# Patient Record
Sex: FEMALE | Race: WHITE | NOT HISPANIC OR LATINO | Employment: FULL TIME | URBAN - METROPOLITAN AREA
[De-identification: names, ages, dates, MRNs, and addresses within clinical notes are randomized per-mention and may not be internally consistent; named-entity substitution may affect disease eponyms.]

---

## 2021-03-26 DIAGNOSIS — Z23 ENCOUNTER FOR IMMUNIZATION: ICD-10-CM

## 2022-06-03 ENCOUNTER — OFFICE VISIT (OUTPATIENT)
Dept: OBGYN CLINIC | Facility: MEDICAL CENTER | Age: 62
End: 2022-06-03
Payer: COMMERCIAL

## 2022-06-03 VITALS
DIASTOLIC BLOOD PRESSURE: 82 MMHG | SYSTOLIC BLOOD PRESSURE: 130 MMHG | HEIGHT: 66 IN | BODY MASS INDEX: 20.48 KG/M2 | WEIGHT: 127.4 LBS

## 2022-06-03 DIAGNOSIS — N81.4 UTERINE PROLAPSE: ICD-10-CM

## 2022-06-03 DIAGNOSIS — N95.0 POSTMENOPAUSAL BLEEDING: Primary | ICD-10-CM

## 2022-06-03 DIAGNOSIS — B37.3 YEAST VAGINITIS: ICD-10-CM

## 2022-06-03 PROCEDURE — 99203 OFFICE O/P NEW LOW 30 MIN: CPT | Performed by: STUDENT IN AN ORGANIZED HEALTH CARE EDUCATION/TRAINING PROGRAM

## 2022-06-03 RX ORDER — OMEGA-3/DHA/EPA/FISH OIL 300-1000MG
CAPSULE ORAL
COMMUNITY

## 2022-06-03 RX ORDER — FLUCONAZOLE 150 MG/1
TABLET ORAL
Qty: 2 TABLET | Refills: 0 | Status: SHIPPED | OUTPATIENT
Start: 2022-06-03 | End: 2022-06-10

## 2022-06-03 NOTE — PROGRESS NOTES
Assessment/Plan:      Diagnoses and all orders for this visit:    Postmenopausal bleeding  Biopsy if thickened endometrial stripe  No bleeding on exam    -     US pelvis complete w transvaginal; Future    Uterine prolapse  Discussed options - PT, pessary, surgery  Will start with PT  Given pamphlet with contact info  Yeast vaginitis  Diflucan sent  F/u for annual exam     Other orders  -     fish oil-omega-3 fatty acids 1000 MG capsule; Take by mouth          Subjective:     Patient ID: Bj Schulz is a 57 y o  female  58 yo [id number] presents with a few episodes of bright red spotting which started 3 wks ago  She now has mucousy clear discharge for which she's had to wear a tampon  She does have some itching  No odor  Marcia Cheney went through menopause at 50  Her last pap was 10 years ago  She is sexually active w/ her  of 40 years  Marcia Cheney has known uterine prolapse  She is symptomatic  She feels a bulge and sometimes has urinary frequency and difficulty emptying her bladder  Review of Systems   All other systems reviewed and are negative  Objective:     Physical Exam  Vitals reviewed  Pulmonary:      Effort: Pulmonary effort is normal    Genitourinary:     Labia:         Right: No rash  Left: No rash  Vagina: Vaginal discharge present  Cervix: No cervical motion tenderness, discharge, friability or lesion  Uterus: With uterine prolapse  Adnexa:         Right: No mass, tenderness or fullness  Left: No mass, tenderness or fullness  Comments: Clear discharge, +hyphae on microscopy  Neurological:      Mental Status: She is alert and oriented to person, place, and time     Psychiatric:         Behavior: Behavior normal

## 2022-06-08 ENCOUNTER — TELEPHONE (OUTPATIENT)
Dept: OBGYN CLINIC | Facility: CLINIC | Age: 62
End: 2022-06-08

## 2022-06-08 DIAGNOSIS — Z87.42 HISTORY OF VAGINAL DISCHARGE: Primary | ICD-10-CM

## 2022-06-08 NOTE — TELEPHONE ENCOUNTER
Pt of CS but she is out of office  Spoke with pt, she informed me she took first tablet Friday, sx gone Saturday but then returned early Sunday and took second tablet Sunday night  Sx then continued through Monday  Having the discharge and itching still  Does pt need another OVS or other recc?

## 2022-06-08 NOTE — TELEPHONE ENCOUNTER
Pt got prescribed diflucan for yeast infection  She took the first tablet Friday night, symptoms were still there on Saturday Took the second tablet Sunday, symptoms went away, then Monday, they came back   Would like to know if there are other options    Please advise

## 2022-06-08 NOTE — TELEPHONE ENCOUNTER
If she is willing to try a cream we can try mycolog  The combination of both antifungal and steroid may help her     If this does not work then she needs office visit    Daily x 7 days

## 2022-06-14 ENCOUNTER — HOSPITAL ENCOUNTER (OUTPATIENT)
Dept: ULTRASOUND IMAGING | Facility: CLINIC | Age: 62
Discharge: HOME | End: 2022-06-14
Attending: STUDENT IN AN ORGANIZED HEALTH CARE EDUCATION/TRAINING PROGRAM
Payer: COMMERCIAL

## 2022-06-14 DIAGNOSIS — N95.0 POSTMENOPAUSAL BLEEDING: ICD-10-CM

## 2022-06-14 PROCEDURE — 76856 US EXAM PELVIC COMPLETE: CPT

## 2022-06-14 PROCEDURE — 76830 TRANSVAGINAL US NON-OB: CPT

## 2022-06-15 ENCOUNTER — NURSE TRIAGE (OUTPATIENT)
Dept: OTHER | Facility: OTHER | Age: 62
End: 2022-06-15

## 2022-06-15 DIAGNOSIS — N39.0 URINARY TRACT INFECTION WITHOUT HEMATURIA, SITE UNSPECIFIED: Primary | ICD-10-CM

## 2022-06-15 NOTE — TELEPHONE ENCOUNTER
Regarding: possible UTI   ----- Message from Brenda Taveras sent at 6/15/2022  5:38 PM EDT -----  "I am having  lower back pain/ pressure and pressure in my abdomen  There was a lot more blood than normal  We thought it was a yeast infection  I think I am having a UTI instead   I am having the urge to go frequently and overall exhausted "

## 2022-06-15 NOTE — TELEPHONE ENCOUNTER
Patient called in to report low back pain with urinary frequency; felt yeast infection was resolved and no further itching  On call provider contacted and advised to order Urinalysis and urine culture  Patient will go to lab 6/16/22 AM  Patient advised to follow up with office  Reason for Disposition   [1] Symptoms of a "yeast infection" (i e , itchy, white discharge, not bad smelling) AND [2] not improved > 3 days following CARE ADVICE   Urinating more frequently than usual (i e , frequency)    Answer Assessment - Initial Assessment Questions  1  DISCHARGE: "Describe the discharge " (e g , white, yellow, green, gray, foamy, cottage cheese-like)      Clear with brown tint with bloody spurt    2  ODOR: "Is there a bad odor?"      Denies    3  ONSET: "When did the discharge begin?"      March 2022    4  RASH: "Is there a rash in that area?" If Yes, ask: "Describe it " (e g , redness, blisters, sores, bumps)      Denies    5  ABDOMINAL PAIN: "Are you having any abdominal pain?" If Yes, ask: "What does it feel like? " (e g , crampy, dull, intermittent, constant)       Lower back pain mid; crampy    6  ABDOMINAL PAIN SEVERITY: If present, ask: "How bad is it?"  (e g , mild, moderate, severe)   - MILD - doesn't interfere with normal activities    - MODERATE - interferes with normal activities or awakens from sleep    - SEVERE - patient doesn't want to move (R/O peritonitis)      Mild    7  CAUSE: "What do you think is causing the discharge?" "Have you had the same problem before? What happened then?"      Yeast infection treated, but not fully resolved    8  OTHER SYMPTOMS: "Do you have any other symptoms?" (e g , fever, itching, vaginal bleeding, pain with urination, injury to genital area, vaginal foreign body)      Urine frequency with low back pain and pressure    9   PREGNANCY: "Is there any chance you are pregnant?" "When was your last menstrual period?"      Denies    Answer Assessment - Initial Assessment Questions  1  SYMPTOM: "What's the main symptom you're concerned about?" (e g , frequency, incontinence)      Frequency    2  ONSET: "When did the smptom start?"     6/13/22    3  PAIN: "Is there any pain?" If Yes, ask: "How bad is it?" (Scale: 1-10; mild, moderate, severe)      Mild    4  CAUSE: "What do you think is causing the symptoms?"      Possible UTI    5  OTHER SYMPTOMS: "Do you have any other symptoms?" (e g , fever, flank pain, blood in urine, pain with urination)      Low back pain    6   PREGNANCY: "Is there any chance you are pregnant?" "When was your last menstrual period?"      Denies    Protocols used: VAGINAL DISCHARGE-ADULT-AH, [facility]

## 2022-06-16 ENCOUNTER — APPOINTMENT (OUTPATIENT)
Dept: LAB | Facility: CLINIC | Age: 62
End: 2022-06-16
Payer: COMMERCIAL

## 2022-06-16 ENCOUNTER — TELEPHONE (OUTPATIENT)
Dept: OBGYN CLINIC | Facility: CLINIC | Age: 62
End: 2022-06-16

## 2022-06-16 ENCOUNTER — EVALUATION (OUTPATIENT)
Dept: PHYSICAL THERAPY | Age: 62
End: 2022-06-16
Payer: COMMERCIAL

## 2022-06-16 DIAGNOSIS — N81.4 UTERINE PROLAPSE: Primary | ICD-10-CM

## 2022-06-16 LAB
BACTERIA UR QL AUTO: NORMAL /HPF
BILIRUB UR QL STRIP: NEGATIVE
CLARITY UR: CLEAR
COLOR UR: NORMAL
GLUCOSE UR STRIP-MCNC: NEGATIVE MG/DL
HGB UR QL STRIP.AUTO: NEGATIVE
KETONES UR STRIP-MCNC: NEGATIVE MG/DL
LEUKOCYTE ESTERASE UR QL STRIP: NEGATIVE
NITRITE UR QL STRIP: NEGATIVE
NON-SQ EPI CELLS URNS QL MICRO: NORMAL /HPF
PH UR STRIP.AUTO: 6 [PH]
PROT UR STRIP-MCNC: NEGATIVE MG/DL
RBC #/AREA URNS AUTO: NORMAL /HPF
SP GR UR STRIP.AUTO: 1.01 (ref 1–1.03)
UROBILINOGEN UR STRIP-ACNC: <2 MG/DL
WBC #/AREA URNS AUTO: NORMAL /HPF

## 2022-06-16 PROCEDURE — 97162 PT EVAL MOD COMPLEX 30 MIN: CPT | Performed by: PHYSICAL THERAPIST

## 2022-06-16 PROCEDURE — 97110 THERAPEUTIC EXERCISES: CPT | Performed by: PHYSICAL THERAPIST

## 2022-06-16 PROCEDURE — 81001 URINALYSIS AUTO W/SCOPE: CPT

## 2022-06-16 PROCEDURE — 87086 URINE CULTURE/COLONY COUNT: CPT

## 2022-06-16 NOTE — PROGRESS NOTES
PT Evaluation     Today's date: 2022  Patient name: Bridget Hays  : 1960  MRN: [id number]  Referring provider: Dora Rosado*  Dx:   Encounter Diagnosis     ICD-10-CM    1  Uterine prolapse  N81 4        Start Time: 1015  Stop Time: 1115  Total time in clinic (min): 60 minutes    Assessment  Assessment details: Jordon Zendejas is a 57year old female who presents with known pelvic organ prolapse  Direct PFM examination deferred this date due to possible active infection  Time spent on patient education regarding body mechanics for toileting, PFM relaxation, bladder health and double voiding  Issued HEP with understanding verbalized  Patient will benefit from a trial of PFPT to achieve goals as outlined  Understanding of Dx/Px/POC: good   Prognosis: good    Goals  STG 2 weeks  1  PFM examination  2  Reduce bladder irritants  3  Improve bladder emptying through relaxation and double voiding    LTG  3-12 weeks  1  HEP, ongoing throughout POC  2  3/5 or greater PFM with full relaxation post activation  3  Demonstrate proper breathing and activation for lifting and ADLs to minimize stress to PFM  4 Return to exercise  Plan  Patient would benefit from: skilled physical therapy  Planned therapy interventions: manual therapy, motor coordination training, neuromuscular re-education, patient education, therapeutic activities, therapeutic exercise, behavior modification and home exercise program  Frequency: 1x week  Duration in weeks: 10  Plan of Care beginning date: 2022  Plan of Care expiration date: 2022  Treatment plan discussed with: patient        PT Pelvic Floor Subjective:   History of Present Illness:   Was diagnosed with uterine prolapse 12 years ago  One year ago PT for hip issues post vigorous exercise on her own  Noted hip improvement  Was doing deep squats with holding and feels that POP worsened  Notes since April noting vaginal discharge   Was treated for yeast infection 13 days ago  Discharge persists  Saturating pantyliner every two hours with clear fluid and blood Transvaginal US yesterday  Has been feeling LBP and abdominal cramping  Is to have urinalysis today  Would like to return to exercise but is concerned of worsening prolapse  Social Support:     Lives with:  Spouse    Work status: employed full time    Life stress severity: moderate and severe  Diet and Exercise:      Exercise type: walking    Exercise frequency: daily    Walks dog  Floor exercises  OB/ gyn History    Gestational History:     Number of term pregnancies: 3  Bladder Function:     Voiding Difficulties positive for: urgency, hesitancy and incomplete emptying      Voiding Difficulties comments:     Voiding frequency: every 3-4 hours    Urinary leakage: no urine leakage    Painful urination: No      Fluid Intake Type:  Coffee, water, tea and alcohol    Intake (ounces): Intake (ounces) comment: 2 cups of coffee in the moring  Tea  Wine    Patient education today regarding voiding mechanisms and bladder health  Discussed double voiding and reducing bladder irritants  Bowel Function:     Bowel frequency: daily    Uses "squatty potty": no Squatty Potty (discussed use today)  Sexual Function:     Sexually Active:  Sexually active    Dyspareunia: occasional pain that eases with position changes      Pain:     Current pain ratin    Location:  Left hip lateral with lateral discomfort to ankle; LBP and lower abdominal cramping  Patient Goals:     Patient goals for therapy:  Improved bladder or bowel function, fully empty bladder or bowels and return to sport/leisure activities    Other patient goals:  Physically active without worrying about aggravating; empty bladder fully      Objective     Neurological Testing     Sensation     Lumbar   Left   Intact: light touch    Right   Intact: light touch    General Comments:    Lower quarter screen   Knees: unremarkable  Foot/ankle: unremarkable    Hip Comments   History of bilateral hip pain; left worse  L LE pain at times  Limited bilateral hip ER, otherwise WFLS  Adductor and piriformis tightness  4-/5 hip musculature  Pelvic Floor Exam   Abdominal assessment: Deferred direct PFM examination due to vaginal discharge and currently undergoing treatment and diagnostic testing    [address] instruction this date with emphasis on sensing PFM relaxation               Precautions:      Manuals 6/16                                                                Neuro Re-Ed                                                                                                        Ther Ex             CRK 3"/10"/10x  HEP                                                                                                       Ther Activity                                       Gait Training                                       Modalities

## 2022-06-17 ENCOUNTER — TELEPHONE (OUTPATIENT)
Dept: OBGYN CLINIC | Facility: CLINIC | Age: 62
End: 2022-06-17

## 2022-06-17 LAB — BACTERIA UR CULT: NORMAL

## 2022-06-17 NOTE — TELEPHONE ENCOUNTER
IN REGARDS to previous note, pt would like a call to discuss,   both this & her Pelvis US results please,  Thanks

## 2022-06-17 NOTE — TELEPHONE ENCOUNTER
Pt called, states she continues to have vaginal discharge, clear fluid, with brown around edges of panty liner " denies vag odor  States itching resolved with diflucan taken on 6/3 & 6/5  Pt has a hx of vag  fungal infection in the past  Per Dr Barbee Bence, recommendation, pt can use mycolog daily x 7 days  Order Rx'd to pharmacy  Informed pt if no relief of symptoms, then she will need an OV  Advised pt to call pharmacy prior to picking up medication  Routed to DR MANNING for her signature

## 2022-06-20 ENCOUNTER — TELEPHONE (OUTPATIENT)
Dept: OBGYN CLINIC | Facility: CLINIC | Age: 62
End: 2022-06-20

## 2022-06-20 NOTE — TELEPHONE ENCOUNTER
Called and spoke to pt and informed below  She is aware to call us if continued bleeding  Said the nystatin has been helping  Also mentioned she starts therapy this week as well

## 2022-06-22 ENCOUNTER — OFFICE VISIT (OUTPATIENT)
Dept: PHYSICAL THERAPY | Age: 62
End: 2022-06-22
Payer: COMMERCIAL

## 2022-06-22 DIAGNOSIS — N81.4 UTERINE PROLAPSE: Primary | ICD-10-CM

## 2022-06-22 PROCEDURE — 97110 THERAPEUTIC EXERCISES: CPT | Performed by: PHYSICAL THERAPIST

## 2022-06-22 NOTE — PROGRESS NOTES
Daily Note     Today's date: 2022  Patient name: Jackson Carr  : 1960  MRN: [id number]  Referring provider: Nacho Goodwin*  Dx:   Encounter Diagnosis     ICD-10-CM    1  Uterine prolapse  N81 4        Start Time:   Stop Time: 711  Total time in clinic (min): 57 minutes    Subjective: US was negative and does not need to have biopsy  Negative for UTI  Now on day 4 of yeast infection treatment  Notes suprapubic cramping today and now at time of treatment  Notes some mucous and green vaginal discharge for which she will contact her doctor  Notes prolapse more present at the end of the day  Denies back pain today  Notes left hip soreness without L LE radicular  Using stooll for BM with greater ease  Objective: See treatment diary below      Assessment: Tolerated treatment well  Patient would benefit from continued PT Deferred direct PFM assessment due to presenting symptoms and continued treatment  HEP instruction this date  Verbalized and demonstrated understanding  Written handouts provided  Plan: Continue per plan of care        Precautions:      Manuals                                                                Neuro Re-Ed                                                                                                        Ther Ex             CRK 3"/10"/10x  HEP 5"/10"/10x           Ball with Kegel  3"  /10x  HEP           TB with Kegel  BTB  10x  HEP           Segmental bridge  10x  HEP           clam  10x  HEP                                     LE stretch  10'  PP           Ther Activity                                       Gait Training                                       Modalities

## 2022-06-29 NOTE — PROGRESS NOTES
Daily Note     Today's date: 2022  Patient name: Jorge Crum  : 1960  MRN: [id number]  Referring provider: Rowan Crew*  Dx:   Encounter Diagnosis     ICD-10-CM    1  Uterine prolapse  N81 4        Start Time: 310  Stop Time: 401  Total time in clinic (min): 51 minutes    Subjective: Notes spotting has decreased  Has finished treatment for yeast infection  Notes clear vaginal fluid that seems copious at times  Relates she will call GYN  Objective: See treatment diary below      Assessment: Tolerated treatment well  Patient would benefit from continued PT Direct PFM examination today  Lacks voluntary contraction with cough with good voluntary relaxation  Strength 3/5 left; 2+/5 right with limited lift  Endurance at 4 seconds for 4 repetitions before 50% decrease in power  Anterior/ central POP noted at rest visible at introitus that protrudes past hymenal remnant with bearing down  Reviewed exercises with proper breathing technique and ways to avoid strain on PF musculature  Patient verbalzied understanding  Patient is quite active and wishes to remain so  Suggested she may benefit from a pessary due to activity level and degree of prolapse  Plan: Continue per plan of care        Precautions:      Manuals                                                               Neuro Re-Ed                                                                                           PFM assessment   15          Ther Ex             CRK 3"/10"/10x  HEP 5"/10"/10x 10'          Ball with Kegel  3"  /10x  HEP           TB with Kegel  BTB  10x  HEP           Segmental bridge  10x  HEP review          clam  10x  HEP                                     LE stretch  10'  PP           Ther Activity             Pt education for breath and lift   10                       Gait Training                                       Modalities

## 2022-06-30 ENCOUNTER — OFFICE VISIT (OUTPATIENT)
Dept: PHYSICAL THERAPY | Age: 62
End: 2022-06-30
Payer: COMMERCIAL

## 2022-06-30 DIAGNOSIS — N81.4 UTERINE PROLAPSE: Primary | ICD-10-CM

## 2022-06-30 PROCEDURE — 97110 THERAPEUTIC EXERCISES: CPT | Performed by: PHYSICAL THERAPIST

## 2022-07-06 ENCOUNTER — TELEPHONE (OUTPATIENT)
Dept: OBGYN CLINIC | Facility: CLINIC | Age: 62
End: 2022-07-06

## 2022-07-06 NOTE — TELEPHONE ENCOUNTER
Pt has a grade 3 prolapse  Was seen for it and she is debating a pessary  She had some  bleeding and had an u/s - 3 mm lining  She has also been rxed with 2 diflucan and nystatin cream  Pt very active and lifts grandkids and exercises a lot  She had moderate amt bleeding Sat and Sun  Off and on spotting  I am not sure - but could prolapse be pulling and bleeding  She is in pt for this presently  Pt still has small isabel yellow/green dsch  No odor  I have no idea what to do/??   Please advise and thank you

## 2022-07-06 NOTE — TELEPHONE ENCOUNTER
Pt has a bladder prolapse; she was treated for a problem by Dr Ryan Luna; was treated for yeast and then was ordered a cream, she recently has some blood when wiping and has a greenish mucos  She is going to pelvic floor physical therapy and was diagnosed with level 3  She is very active - was lifing grandchildren  Pt also had pelvic & abdominal ultrasound done a few wks ago  Pt of Dr Ryan Luna

## 2022-07-15 ENCOUNTER — OFFICE VISIT (OUTPATIENT)
Dept: OBGYN CLINIC | Facility: CLINIC | Age: 62
End: 2022-07-15

## 2022-07-15 VITALS
BODY MASS INDEX: 20.79 KG/M2 | HEIGHT: 65 IN | SYSTOLIC BLOOD PRESSURE: 132 MMHG | DIASTOLIC BLOOD PRESSURE: 88 MMHG | WEIGHT: 124.8 LBS

## 2022-07-15 DIAGNOSIS — N81.2 UTEROVAGINAL PROLAPSE, INCOMPLETE: ICD-10-CM

## 2022-07-15 DIAGNOSIS — N95.0 POSTMENOPAUSAL BLEEDING: Primary | ICD-10-CM

## 2022-07-15 PROCEDURE — 88305 TISSUE EXAM BY PATHOLOGIST: CPT | Performed by: PATHOLOGY

## 2022-07-15 PROCEDURE — G0145 SCR C/V CYTO,THINLAYER,RESCR: HCPCS | Performed by: OBSTETRICS & GYNECOLOGY

## 2022-07-15 RX ORDER — DIPHENOXYLATE HYDROCHLORIDE AND ATROPINE SULFATE 2.5; .025 MG/1; MG/1
1 TABLET ORAL DAILY
COMMUNITY

## 2022-07-18 LAB
HPV HR 12 DNA CVX QL NAA+PROBE: NEGATIVE
HPV16 DNA CVX QL NAA+PROBE: NEGATIVE
HPV18 DNA CVX QL NAA+PROBE: NEGATIVE

## 2022-07-22 LAB
LAB AP GYN PRIMARY INTERPRETATION: NORMAL
Lab: NORMAL

## 2022-09-14 ENCOUNTER — ANNUAL EXAM (OUTPATIENT)
Dept: OBGYN CLINIC | Facility: CLINIC | Age: 62
End: 2022-09-14
Payer: COMMERCIAL

## 2022-09-14 VITALS
BODY MASS INDEX: 20.62 KG/M2 | SYSTOLIC BLOOD PRESSURE: 124 MMHG | WEIGHT: 123.8 LBS | HEIGHT: 65 IN | DIASTOLIC BLOOD PRESSURE: 70 MMHG

## 2022-09-14 DIAGNOSIS — N95.0 POST-MENOPAUSAL BLEEDING: ICD-10-CM

## 2022-09-14 DIAGNOSIS — Z01.419 ENCOUNTER FOR ANNUAL ROUTINE GYNECOLOGICAL EXAMINATION: Primary | ICD-10-CM

## 2022-09-14 DIAGNOSIS — Z12.31 ENCOUNTER FOR SCREENING MAMMOGRAM FOR MALIGNANT NEOPLASM OF BREAST: ICD-10-CM

## 2022-09-14 DIAGNOSIS — N81.10 PELVIC ORGAN PROLAPSE QUANTIFICATION STAGE 2 CYSTOCELE: ICD-10-CM

## 2022-09-14 DIAGNOSIS — Z12.11 ENCOUNTER FOR SCREENING COLONOSCOPY: ICD-10-CM

## 2022-09-14 PROCEDURE — A4562 PESSARY, NON RUBBER,ANY TYPE: HCPCS | Performed by: STUDENT IN AN ORGANIZED HEALTH CARE EDUCATION/TRAINING PROGRAM

## 2022-09-14 PROCEDURE — S0612 ANNUAL GYNECOLOGICAL EXAMINA: HCPCS | Performed by: STUDENT IN AN ORGANIZED HEALTH CARE EDUCATION/TRAINING PROGRAM

## 2022-09-14 RX ORDER — FLUTICASONE PROPIONATE 50 MCG
2 SPRAY, SUSPENSION (ML) NASAL DAILY
COMMUNITY

## 2022-09-14 RX ORDER — BUPROPION HYDROCHLORIDE 150 MG/1
150 TABLET ORAL EVERY MORNING
COMMUNITY
Start: 2022-08-04

## 2022-09-26 PROCEDURE — 57160 INSERT PESSARY/OTHER DEVICE: CPT | Performed by: STUDENT IN AN ORGANIZED HEALTH CARE EDUCATION/TRAINING PROGRAM

## 2022-09-26 NOTE — PROGRESS NOTES
Assessment/Plan:      Diagnoses and all orders for this visit:    Encounter for annual routine gynecological examination  Pap due 2027  Colon cancer screening up to date  Encounter for screening mammogram for malignant neoplasm of breast  -     Mammo screening bilateral w 3d & cad; Future    Encounter for screening colonoscopy    Pelvic organ prolapse quantification stage 2 cystocele  Fit for pessary today - see note    Post-menopausal bleeding  Recurrent w/ insufficient biopsy  Recommend D&C  Will f/u for pre-op  Other orders  -     buPROPion (WELLBUTRIN XL) 150 mg 24 hr tablet; Take 150 mg by mouth every morning  -     fluticasone (FLONASE) 50 mcg/act nasal spray; 2 sprays into each nostril daily          Subjective:     Patient ID: Rashi Moreira is a 57 y o  female  This is a 57 y o  postmenopausal [id number] who presents for annual exam      Concerns: pelvic organ prolapse, completed pelvic floor PT  Interested in pessary  Recurrent post menopausal bleeding over the past several months  Sometimes wears tampon for prolapse and notices blood, but also has had bleeding when not wearing tampon  Reported bleeding at her last visit w/ me in June - ultrasound showed 3 mm stripe  Had endometrial biopsy 7/15 which was insufficient    She denies vaginal bleeding, discharge, or pelvic pain  Sexually active: yes, w/o concerns  STD testing: declines    Screening:  Last pap smear: 7/15/22 neg/neg  Last mammogram: Mammo 8/23/22 BI-RADS 1   Colon Cancer screening: cologuard 2022 negative    Family history:  Breast cancer: no  Colon cancer: no   Ovarian cancer: no        Review of Systems   Constitutional: Negative  HENT: Negative  Eyes: Negative  Respiratory: Negative  Cardiovascular: Negative  Gastrointestinal: Negative  Endocrine: Negative  Genitourinary: Positive for vaginal bleeding   Negative for dyspareunia, dysuria, frequency, menstrual problem, pelvic pain, vaginal discharge and vaginal pain    Musculoskeletal: Negative  Skin: Negative  Allergic/Immunologic: Negative  Neurological: Negative  Hematological: Negative  Psychiatric/Behavioral: Negative  Objective:     Physical Exam  Vitals reviewed  Cardiovascular:      Rate and Rhythm: Normal rate  Pulmonary:      Effort: Pulmonary effort is normal    Chest:   Breasts: Breasts are symmetrical       Right: No mass, nipple discharge, skin change or tenderness  Left: No mass, nipple discharge, skin change or tenderness  Abdominal:      Palpations: Abdomen is soft  Genitourinary:     Vagina: Normal  No signs of injury  Uterus: Not enlarged and not fixed  Adnexa:         Right: No mass  Left: No mass  Musculoskeletal:      Cervical back: Normal range of motion  Skin:     General: Skin is warm and dry  Neurological:      Mental Status: She is alert and oriented to person, place, and time

## 2022-09-26 NOTE — PROGRESS NOTES
Pessary    Date/Time: 9/26/2022 2:41 PM  Performed by: Tiffany Lima MD  Authorized by: Tiffany Lima MD   Universal Protocol:  Consent: Verbal consent obtained  Risks and benefits: risks, benefits and alternatives were discussed  Consent given by: patient  Patient understanding: patient states understanding of the procedure being performed  Patient identity confirmed: verbally with patient      Indication:     Indication for pessary: uterine prolapse and cystocele    Pre-procedure:     Pessary procedure type:  Fitting  Procedure:     Pessary type:  Ring w/ support    Pessary size:  4    Patient tolerance of procedure:  Aborted and tolerated well, no immediate complications  Comments:     Procedure comments: Will trial pessary  Aware of reasons to call - pain, bleeding, difficulty with bowel or bladder function

## 2022-09-28 ENCOUNTER — OFFICE VISIT (OUTPATIENT)
Dept: OBGYN CLINIC | Facility: CLINIC | Age: 62
End: 2022-09-28
Payer: COMMERCIAL

## 2022-09-28 VITALS
DIASTOLIC BLOOD PRESSURE: 78 MMHG | WEIGHT: 125 LBS | SYSTOLIC BLOOD PRESSURE: 120 MMHG | HEIGHT: 65 IN | BODY MASS INDEX: 20.83 KG/M2

## 2022-09-28 DIAGNOSIS — N81.11 MIDLINE CYSTOCELE: Primary | ICD-10-CM

## 2022-09-28 PROCEDURE — 99213 OFFICE O/P EST LOW 20 MIN: CPT | Performed by: STUDENT IN AN ORGANIZED HEALTH CARE EDUCATION/TRAINING PROGRAM

## 2022-09-28 NOTE — PROGRESS NOTES
Assessment/Plan:      Diagnoses and all orders for this visit:    Midline cystocele  Will try #3 ring pessary  Has pre-op with KTM for recurrent PMB, insufficient EMB  No further bleeding since last visit  Subjective:     Patient ID: Maria Antonia Boswell is a 57 y o  female  56 yo F w/ pelvic organ prolapse presents for f/u from pessary fitting  She went home with a #4 ring pessary but had discomfort  She took it out and tried to replace it but still had pain  She is interested in trying a smaller size  Review of Systems   All other systems reviewed and are negative  Objective:     Physical Exam  Vitals reviewed  Genitourinary:     Comments: #3 ring pessary placed w/o issue  Neurological:      Mental Status: She is alert and oriented to person, place, and time     Psychiatric:         Behavior: Behavior normal

## 2022-10-30 NOTE — PROGRESS NOTES
Assessment/Plan:    Postmenopausal bleeding  -     Tissue Exam  -     Liquid-based pap, screening  -     HPV High Risk    Uterovaginal prolapse, incomplete    Other orders  -     multivitamin (THERAGRAN) TABS; Take 1 tablet by mouth daily  -     CALCIUM PO; Take by mouth        Subjective:      Patient ID: Theophilus Hashimoto is a 57 y o  female  [facility] presents for problem visit  She is 56yo and postmenopausal       Symptoms have been present since early May  She has not had any bleeding since July 3rd - and bleeding seems to only be present with heavy lifting  She denies pain and cramping  Discomfort related to prolapse  Pelvic US on 6/14/22:  3mm endometrial stripe            The following portions of the patient's history were reviewed and updated as appropriate: allergies, current medications and problem list     Review of Systems      Objective:      /88 (BP Location: Left arm, Patient Position: Sitting, Cuff Size: Standard)   Ht 5' 5" (1 651 m)   Wt 56 6 kg (124 lb 12 8 oz)   BMI 20 77 kg/m²          Physical Exam  Vitals reviewed  Constitutional:       Appearance: Normal appearance  She is normal weight  Genitourinary:     Comments: Uterovaginal prolapse present  Pap/HPV and EMB collected  Neurological:      Mental Status: She is alert

## 2022-10-31 PROBLEM — M19.042 PRIMARY OSTEOARTHRITIS OF BOTH HANDS: Status: ACTIVE | Noted: 2021-08-03

## 2022-10-31 PROBLEM — E78.2 MIXED HYPERLIPIDEMIA: Status: ACTIVE | Noted: 2018-09-25

## 2022-10-31 PROBLEM — I78.1 NON-NEOPLASTIC NEVUS: Status: ACTIVE | Noted: 2017-06-20

## 2022-10-31 PROBLEM — I78.1 TELANGIECTASIA DISORDER: Status: ACTIVE | Noted: 2017-06-20

## 2022-10-31 PROBLEM — M19.041 PRIMARY OSTEOARTHRITIS OF BOTH HANDS: Status: ACTIVE | Noted: 2021-08-03

## 2022-10-31 PROBLEM — F32.9 REACTIVE DEPRESSION: Status: ACTIVE | Noted: 2022-08-04

## 2022-10-31 PROBLEM — F41.9 ANXIETY: Status: ACTIVE | Noted: 2022-08-04

## 2022-10-31 PROBLEM — N95.0 POSTMENOPAUSAL BLEEDING: Status: ACTIVE | Noted: 2022-10-31

## 2022-10-31 NOTE — ASSESSMENT & PLAN NOTE
Insufficient endometirlal biopsy - scant material  Ultrasound shows thin endometrial stripe 3mm  Options: D&C hysteroscopy vs observation  Bleeding has resolved s/p treatment of vaginitis and use of pessary  IF return of bleeding, plan for imaging and consider rebiopsy  Most likely cause at this time atrophy, vaginitis, prolapse  Low risk for hyperplasia, cancer

## 2022-11-01 ENCOUNTER — OFFICE VISIT (OUTPATIENT)
Dept: OBGYN CLINIC | Facility: CLINIC | Age: 62
End: 2022-11-01

## 2022-11-01 VITALS
HEIGHT: 65 IN | DIASTOLIC BLOOD PRESSURE: 66 MMHG | SYSTOLIC BLOOD PRESSURE: 114 MMHG | BODY MASS INDEX: 20.79 KG/M2 | WEIGHT: 124.8 LBS

## 2022-11-01 DIAGNOSIS — N95.0 POSTMENOPAUSAL BLEEDING: Primary | ICD-10-CM

## 2022-11-01 NOTE — PROGRESS NOTES
Assessment/Plan:    Postmenopausal bleeding  Insufficient endometirlal biopsy - scant material  Ultrasound shows thin endometrial stripe 3mm  Options: D&C hysteroscopy vs observation  Bleeding has resolved s/p treatment of vaginitis and use of pessary  IF return of bleeding, plan for imaging and consider rebiopsy  Most likely cause at this time atrophy, vaginitis, prolapse  Low risk for hyperplasia, cancer  Diagnoses and all orders for this visit:    Postmenopausal bleeding          Subjective:      Patient ID: Glen Traore is a 57 y o  female  Patient present to discuss D&C  Recurrent PMB  Last time there was bleeding? May    61 yo G5P 3 0 2 3 with follow up to vaginal bleeding  Has had imaging completed 3mm stripe and has had biopsy  In addition, dx'd with yeast infection and prolapse  She now is using a pessary and had tried pelvic floor physical therapy  No further bleeding has been noted    Reviewed possible plan of care - D&C, repeat biopsy, repeat imaging  Pessary  Use - when to change/cleanse  Consider estrogen cream if atrophy worsens or OTC lubricants/perineal massage  Is sexually active  Is able to remove pessary by herself and reinsert  Overall happy with pessary at this time  Gynecologic Exam  She complains of vaginal bleeding  She reports no genital itching, genital lesions, genital odor, genital rash, missed menses, pelvic pain or vaginal discharge  This is a new problem  The current episode started more than 1 month ago  The problem occurs rarely  The problem has been resolved since onset  The patient is experiencing no pain  Pertinent negatives include no abdominal pain, back pain, chills, constipation, diarrhea, dysuria, fever, frequency, hematuria, nausea, painful intercourse, rash, sore throat, urgency or vomiting  The vaginal discharge was scant and bloody  The vaginal bleeding is spotting  Patient has not been passing clots   Patient has not been passing tissue  The symptoms are aggravated by activity and heavy lifting  The treatment provided significant relief  She is sexually active  The following portions of the patient's history were reviewed and updated as appropriate:   She  has no past medical history on file  She   Patient Active Problem List    Diagnosis Date Noted   • Postmenopausal bleeding 10/31/2022   • Anxiety 08/04/2022   • Reactive depression 08/04/2022   • Primary osteoarthritis of both hands 08/03/2021   • Mixed hyperlipidemia 09/25/2018   • Non-neoplastic nevus 06/20/2017   • Telangiectasia disorder 06/20/2017   • Lyme disease 09/12/2008     She  has a past surgical history that includes Hartman tooth extraction (2012)  Her family history includes Cancer in her paternal aunt  She  reports that she has quit smoking  She has never used smokeless tobacco  She reports current alcohol use of about 5 0 standard drinks of alcohol per week  She reports that she does not use drugs  Current Outpatient Medications   Medication Sig Dispense Refill   • buPROPion (WELLBUTRIN XL) 150 mg 24 hr tablet Take 150 mg by mouth every morning     • CALCIUM PO Take by mouth     • fish oil-omega-3 fatty acids 1000 MG capsule Take by mouth     • fluticasone (FLONASE) 50 mcg/act nasal spray 2 sprays into each nostril daily     • multivitamin (THERAGRAN) TABS Take 1 tablet by mouth daily     • nystatin-triamcinolone (MYCOLOG-II) cream Use daily for 7 days  (Patient not taking: No sig reported) 30 g 0     No current facility-administered medications for this visit       Current Outpatient Medications on File Prior to Visit   Medication Sig   • buPROPion (WELLBUTRIN XL) 150 mg 24 hr tablet Take 150 mg by mouth every morning   • CALCIUM PO Take by mouth   • fish oil-omega-3 fatty acids 1000 MG capsule Take by mouth   • fluticasone (FLONASE) 50 mcg/act nasal spray 2 sprays into each nostril daily   • multivitamin (THERAGRAN) TABS Take 1 tablet by mouth daily   • nystatin-triamcinolone (MYCOLOG-II) cream Use daily for 7 days  (Patient not taking: No sig reported)     No current facility-administered medications on file prior to visit  She is allergic to codeine       Review of Systems   Constitutional: Negative for activity change, appetite change, chills, fatigue and fever  HENT: Negative for rhinorrhea, sneezing and sore throat  Eyes: Negative for visual disturbance  Respiratory: Negative for cough, shortness of breath and wheezing  Cardiovascular: Negative for chest pain, palpitations and leg swelling  Gastrointestinal: Negative for abdominal distention, abdominal pain, constipation, diarrhea, nausea and vomiting  Genitourinary: Negative for difficulty urinating, dysuria, frequency, hematuria, missed menses, pelvic pain, urgency and vaginal discharge  Musculoskeletal: Negative for back pain  Skin: Negative for rash  Neurological: Negative for syncope and light-headedness           Objective:      /66 (BP Location: Left arm, Patient Position: Sitting, Cuff Size: Standard)   Ht 5' 5" (1 651 m)   Wt 56 6 kg (124 lb 12 8 oz)   BMI 20 77 kg/m²          Physical Exam

## 2023-02-16 ENCOUNTER — OFFICE VISIT (OUTPATIENT)
Dept: DERMATOLOGY | Facility: CLINIC | Age: 63
End: 2023-02-16

## 2023-02-16 VITALS — BODY MASS INDEX: 20.66 KG/M2 | HEIGHT: 65 IN | WEIGHT: 124 LBS

## 2023-02-16 DIAGNOSIS — L81.4 LENTIGO: Primary | ICD-10-CM

## 2023-02-16 DIAGNOSIS — L82.1 SEBORRHEIC KERATOSIS: ICD-10-CM

## 2023-02-16 DIAGNOSIS — Z13.89 SCREENING FOR SKIN CONDITION: ICD-10-CM

## 2023-02-16 RX ORDER — CETIRIZINE HYDROCHLORIDE 10 MG/1
10 TABLET ORAL DAILY
COMMUNITY

## 2023-02-16 RX ORDER — CALCIUM POLYCARBOPHIL 625 MG 625 MG/1
625 TABLET ORAL DAILY
COMMUNITY

## 2023-02-16 NOTE — PROGRESS NOTES
[address]  [address] [facility] [address]  [phone]  [phone]     MRN: [id number] : 1960  Encounter: [id number]  Patient Information: Magy Daniel  Chief complaint: skin lesions     History of present illness: 68-year-old female presents for overall checkup patient has not been seen regarding several spots on her face  No past medical history on file  Past Surgical History:   Procedure Laterality Date   • WISDOM TOOTH EXTRACTION       Social History   Social History     Substance and Sexual Activity   Alcohol Use Yes   • Alcohol/week: 5 0 standard drinks   • Types: 5 Glasses of wine per week     Social History     Substance and Sexual Activity   Drug Use Never     Social History     Tobacco Use   Smoking Status Former   Smokeless Tobacco Never     Family History   Problem Relation Age of Onset   • Cancer Paternal Aunt    • Colon cancer Neg Hx    • Breast cancer Neg Hx    • Cervical cancer Neg Hx    • Uterine cancer Neg Hx      Meds/Allergies   Allergies   Allergen Reactions   • Codeine GI Intolerance       Meds:  Prior to Admission medications    Medication Sig Start Date End Date Taking? Authorizing Provider   buPROPion (WELLBUTRIN XL) 150 mg 24 hr tablet Take 150 mg by mouth every morning 22  Yes Historical Provider, MD   CALCIUM PO Take by mouth   Yes Historical Provider, MD   calcium polycarbophil (FIBERCON) 625 mg tablet Take 625 mg by mouth daily   Yes Historical Provider, MD   cetirizine (ZyrTEC) 10 mg tablet Take 10 mg by mouth daily   Yes Historical Provider, MD   fish oil-omega-3 fatty acids 1000 MG capsule Take by mouth   Yes Historical Provider, MD   fluticasone (FLONASE) 50 mcg/act nasal spray 2 sprays into each nostril daily   Yes Historical Provider, MD   nystatin-triamcinolone (MYCOLOG-II) cream Use daily for 7 days    Patient not taking: No sig reported 22  Vandana Suarez MD   multivitamin [facility]) TABS Take 1 tablet by mouth daily  Patient not taking: Reported on 2/16/2023    Historical Provider, MD       Subjective:     Review of Systems:    General: negative for - chills, fatigue, fever,  weight gain or weight loss  Psychological: negative for - anxiety, behavioral disorder, concentration difficulties, decreased libido, depression, irritability, memory difficulties, mood swings, sleep disturbances or suicidal ideation  ENT: negative for - hearing difficulties , nasal congestion, nasal discharge, oral lesions, sinus pain, sneezing, sore throat  Allergy and Immunology: negative for - hives, insect bite sensitivity,  Hematological and Lymphatic: negative for - bleeding problems, blood clots,bruising, swollen lymph nodes  Endocrine: negative for - hair pattern changes, hot flashes, malaise/lethargy, mood swings, palpitations, polydipsia/polyuria, skin changes, temperature intolerance or unexpected weight change  Respiratory: negative for - cough, hemoptysis, orthopnea, shortness of breath, or wheezing  Cardiovascular: negative for - chest pain, dyspnea on exertion, edema,  Gastrointestinal: negative for - abdominal pain, nausea/vomiting  Genito-Urinary: negative for - dysuria, incontinence, irregular/heavy menses or urinary frequency/urgency  Musculoskeletal: negative for - gait disturbance, joint pain, joint stiffness, joint swelling, muscle pain, muscular weakness  Dermatological:  As in HPI  Neurological: negative for confusion, dizziness, headaches, impaired coordination/balance, memory loss, numbness/tingling, seizures, speech problems, tremors or weakness       Objective:   Ht 5' 5" (1 651 m)   Wt 56 2 kg (124 lb)   BMI 20 63 kg/m²     Physical Exam:    General Appearance:    Alert, cooperative, no distress   Head:    Normocephalic, without obvious abnormality, atraumatic           Skin:   A full skin exam was performed including scalp, head scalp, eyes, ears, nose, lips, neck, chest, axilla, abdomen, back, buttocks, bilateral upper extremities, bilateral lower extremities, hands, feet, fingers, toes, fingernails, and toenails keratotic papules with greasy stuck on appearance and also atypical noted exam lentigines noted nothing else of concern     Assessment:     1  Seborrheic keratosis        2  Screening for skin condition        3  Lentigo              Plan:   Lentigines we will go ahead and use a bleaching agent from [employer]  Seborrheic Keratosis  Patient reasurred these are normal growths we acquire with age no treatment needed  Screening for Dermatologic Disorders: Nothing else of concern noted on complete exam follow up in 1 year       Danitza George MD  1/23/5364,5:97 PM    Portions of the record may have been created with voice recognition software   Occasional wrong word or "sound a like" substitutions may have occurred due to the inherent limitations of voice recognition software   Read the chart carefully and recognize, using context, where substitutions have occurred

## 2023-02-16 NOTE — PROGRESS NOTES
[facility] Note     Patient Name: Jose Rivers  Encounter Date: February 16, 2023     Have you been cared for by a [place] [address] Dermatologist in the last 3 years and, if so, which description applies to you? NO  I am considered a "new" patient and must complete all patient intake questions  I am FEMALE/of child-bearing potential     REVIEW OF SYSTEMS:  Have you recently had or currently have any of the following? · Recent fever or chills? No  · Any non-healing wound? No  · Are you pregnant or planning to become pregnant? No  · Are you currently or planning to be nursing or breast feeding? No   PAST MEDICAL HISTORY:  Have you personally ever had or currently have any of the following? If "YES," then please provide more detail  · Skin cancer (such as Melanoma, Basal Cell Carcinoma, Squamous Cell Carcinoma? No  · Tuberculosis, HIV/AIDS, Hepatitis B or C: No  · Systemic Immunosuppression such as Diabetes, Biologic or Immunotherapy, Chemotherapy, Organ Transplantation, Bone Marrow Transplantation No  · Radiation Treatment No   FAMILY HISTORY:  Any "first degree relatives" (parent, brother, sister, or child) with the following? • Skin Cancer, Pancreatic or Other Cancer? No   PATIENT EXPERIENCE:    • Do you want the Dermatologist to perform a COMPLETE skin exam today including a clinical examination under the "bra and underwear" areas? NO  • If necessary, do we have your permission to call and leave a detailed message on your Preferred Phone number that includes your specific medical information?   Yes      Allergies   Allergen Reactions   • Codeine GI Intolerance      Current Outpatient Medications:   •  buPROPion (WELLBUTRIN XL) 150 mg 24 hr tablet, Take 150 mg by mouth every morning, Disp: , Rfl:   •  CALCIUM PO, Take by mouth, Disp: , Rfl:   •  calcium polycarbophil (FIBERCON) 625 mg tablet, Take 625 mg by mouth daily, Disp: , Rfl:   •  cetirizine (ZyrTEC) 10 mg tablet, Take 10 mg by mouth daily, Disp: , Rfl:   •  fish oil-omega-3 fatty acids 1000 MG capsule, Take by mouth, Disp: , Rfl:   •  fluticasone (FLONASE) 50 mcg/act nasal spray, 2 sprays into each nostril daily, Disp: , Rfl:   •  nystatin-triamcinolone (MYCOLOG-II) cream, Use daily for 7 days   (Patient not taking: No sig reported), Disp: 30 g, Rfl: 0  •  multivitamin (THERAGRAN) TABS, Take 1 tablet by mouth daily (Patient not taking: Reported on 2/16/2023), Disp: , Rfl:           • Whom besides the patient is providing clinical information about today's encounter?   o NO ADDITIONAL HISTORIAN (patient alone provided history)    Physical Exam and Assessment/Plan by Diagnosis:

## 2023-03-09 ENCOUNTER — TELEPHONE (OUTPATIENT)
Dept: OBGYN CLINIC | Facility: CLINIC | Age: 63
End: 2023-03-09

## 2023-03-09 NOTE — TELEPHONE ENCOUNTER
Left voice message to patient that we can reach out to [address] [facility] to see if any sooner appointments  If she prefers a referral we can ask KTM also and get one entered for her- just let ultrasound know

## 2023-03-14 DIAGNOSIS — N81.4 UTERINE PROLAPSE: ICD-10-CM

## 2023-03-14 DIAGNOSIS — N81.11 MIDLINE CYSTOCELE: Primary | ICD-10-CM

## 2023-03-14 NOTE — TELEPHONE ENCOUNTER
Sent referral to Ms Vanessa Moody for Dr Alvaro Bob  In addition, Ms Vanessa Moody would like refills on the nystatin and triam

## 2023-03-14 NOTE — TELEPHONE ENCOUNTER
Spoke with Ms Nicole Sorto to discuss a sooner appointment with Dr Bruno Choudhury  Pt is asking if Dr Rose Mary Garcia can put in a referral for Dr Unique León  I will discuss with Dr Rose Mary Garcia and call Ms Nicole Sorto back to advise

## 2023-06-05 ENCOUNTER — OFFICE VISIT (OUTPATIENT)
Dept: DERMATOLOGY | Facility: CLINIC | Age: 63
End: 2023-06-05
Payer: COMMERCIAL

## 2023-06-05 DIAGNOSIS — R23.4 SKIN TEXTURE CHANGES: ICD-10-CM

## 2023-06-05 DIAGNOSIS — L81.4 LENTIGO: ICD-10-CM

## 2023-06-05 DIAGNOSIS — H02.59 LAXITY OF EYELID: Primary | ICD-10-CM

## 2023-06-05 DIAGNOSIS — L82.1 SEBORRHEIC KERATOSIS: ICD-10-CM

## 2023-06-05 PROCEDURE — COSCON COSMETIC CONSULTATION: Performed by: STUDENT IN AN ORGANIZED HEALTH CARE EDUCATION/TRAINING PROGRAM

## 2023-06-05 RX ORDER — NITROFURANTOIN 25; 75 MG/1; MG/1
CAPSULE ORAL
COMMUNITY
Start: 2023-05-24

## 2023-06-05 RX ORDER — AMOXICILLIN 875 MG/1
875 TABLET, COATED ORAL EVERY 12 HOURS
COMMUNITY
Start: 2023-06-02

## 2023-06-05 RX ORDER — CONJUGATED ESTROGENS 0.62 MG/G
CREAM VAGINAL
COMMUNITY
Start: 2023-05-12

## 2023-06-05 NOTE — PATIENT INSTRUCTIONS
COSMETIC CONSULT, SEBORRHEIC KERATOSES    Assessment and Plan:  Based on a thorough discussion of this condition and the management approach to it (including a comprehensive discussion of the known risks, side effects and potential benefits of treatment), the patient (family) agrees to implement the following specific plan:  Discussed cosmetic removal:  Discussed destruction with electrodessication with light curettage or cryotherapy  Discussed cosmetic charge: $250 charge for shave removal    To help smoothen those on body, recommended using over the counter creams containaining gentle acids  Including:   Skin Fix Renewing cream  Amlactin's line of lotions (lactic acid containing)   Cerave SA (salicylic acid containing) lotion or cream  KP Duty cream (see photo below)  Choose one of the above to start  EYELID LAXITY COSMETIC CONSULT    Assessment and Plan:  Based on a thorough discussion of this condition and the management approach to it (including a comprehensive discussion of the known risks, side effects and potential benefits of treatment), the patient (family) agrees to implement the following specific plan:  -Discussed blepharoplasty as best treatment to remove excess skin  Would recommend plastics or oculoplastics for consultation   -Referral placed for [facility]     COSMETIC CONSULT LEG VEINS    Assessment and Plan:  Based on a thorough discussion of this condition and the management approach to it (including a comprehensive discussion of the known risks, side effects and potential benefits of treatment), the patient (family) agrees to implement the following specific plan:  - Discussed Cosmetic treatment with injections:  - Asclera injections discussed - Price for each site $344   - Recommend Compression stockings 20-30 mmHg for 24-48 hours after treatment  SKIN CARE RECOMMENDATIONS     Morning    Wash your face with a gentle cleanser   Some "examples are listed below but get one that works for you  Avoid ones with harsh scrubbing beads:  Cerave hydrating facial cleanser  Cetaphil gentle cleanser   LaRoche-Posay hydrating gentle cleanser  Neutrogena ultra gentle hydrating cleanser    2  Apply Vitamin C  Look for \"Ascorbic acid\" as an ingredient  This is the most effective form of Vitamin C  Some examples are listed below but get one that works for you  Cerave Skin Renewing Vitamin C Serum:   LaRouge-Posay vitamin C serum   ISDIN flavo-C Ultraglican Ampules  PCA SKIN Vitamin C-Quench Antioxidant Hydrating Facial Serum (formerly known as PCA SKIN C Quench Antioxidant Serum)    3  Apply your peptide treatments  Use these at least once a day on the face, neck, and you can put the remaining on your hands and pat down to your chest   Some examples are listed below but get one that works for you  A  Neocutis Bio Serum Firm Rejuvenating Growth Factor & Peptide Treatment  B  SkinMedica TNS® Advanced+ Serum          4  Please continue to use your moisturizer with SPF 30  Some sunscreen we recommend are:   A  La-Roche Posay Antihelios 50 Mineral    B  Elta MD UV Clear Broad Spectrum SPF 55  C  ISDIN Eryfotona Ageless Broad Spectrum SPF 50  D  Neutrogena Purescreen+ Tinted Mineral Sunscreen    5  Follow with make-up as needed     Night    Wash your face with a gentle cleanser  Some examples are listed below but get one that works for you  Avoid ones with harsh scrubbing beads:  Cerave hydrating facial cleanser  Cetaphil gentle cleanser   LaRoche-Posay hydrating gentle cleanser  Neutrogena ultra gentle hydrating cleanser    2   +/- Second application of peptides  3  Apply an oil free moisturizer  Some examples are listed below but get one that works for you  A  Neutrogena Hydroboost for EXTRADRY skin   B  The Ordinary Natural Moisturizing Factors HA   C  First Aid Beauty Hydrafirm Night Cream  D  Alastin Ultra nourishing Moisturizer    4   Apply a pea-sized " amount of a vitamin A/lightening cream ( TRANEXAMIC ACID 5%/NIACINAMIDE 4%/KOJIC ACID 2%/TRETINOIN 0 025% CREAM)- an even thin layer on your face  Can be drying  You should be contacted within a few days by the pharmacy ([phone]) as discussed via phone, text, or email  They will take your payment information & your medication will be mailed to you  Start by using once weekly for one week (Monday), then increase to twice weekly (Mon, Fri) the next week and then three times weekly on week three (Mon, Wed, Friday)  You can use this on your hands as well  Avoid the eye area and the curves around your nose and corners of your lips  If you find you are still a bit dry doing the above, try sandwiching the cream between two layers of moisturizer  If too irritating, take a night or two off  For example, if you find you are doing well using 2x weekly and get dry and irritated with 3x weekly, go back to using 2x weekly for a week or two and then re-trail increasing how often you use it  5  Can add a second layer of moisturizer  This is helpful if you find you feel your skin is dry

## 2023-06-05 NOTE — PROGRESS NOTES
"[place] [address] [facility] Note     Patient Name: Donte Zimmerman  Encounter Date: 6/5/23     Have you been cared for by a [place] [address] Dermatologist in the last 3 years and, if so, which description applies to you? Yes  I have been here within the last 3 years, and my medical history has NOT changed since that time  I am FEMALE/of child-bearing potential     REVIEW OF SYSTEMS:  Have you recently had or currently have any of the following? · No changes in my recent health  PAST MEDICAL HISTORY:  Have you personally ever had or currently have any of the following? If \"YES,\" then please provide more detail  · No changes in my medical history  FAMILY HISTORY:  Any \"first degree relatives\" (parent, brother, sister, or child) with the following? • No changes in my family's known health  PATIENT EXPERIENCE:    • Do you want the Dermatologist to perform a COMPLETE skin exam today including a clinical examination under the \"bra and underwear\" areas? NO  • If necessary, do we have your permission to call and leave a detailed message on your Preferred Phone number that includes your specific medical information?   Yes      Allergies   Allergen Reactions   • Codeine GI Intolerance      Current Outpatient Medications:   •  buPROPion (WELLBUTRIN XL) 150 mg 24 hr tablet, Take 150 mg by mouth every morning, Disp: , Rfl:   •  CALCIUM PO, Take by mouth, Disp: , Rfl:   •  calcium polycarbophil (FIBERCON) 625 mg tablet, Take 625 mg by mouth daily, Disp: , Rfl:   •  cetirizine (ZyrTEC) 10 mg tablet, Take 10 mg by mouth daily, Disp: , Rfl:   •  Diclofenac Sodium (VOLTAREN) 1 %, apply 2 grams topically to affected area four times a day, Disp: , Rfl:   •  fish oil-omega-3 fatty acids 1000 MG capsule, Take by mouth, Disp: , Rfl:   •  fluticasone (FLONASE) 50 mcg/act nasal spray, 2 sprays into each nostril daily, Disp: , Rfl:   •  multivitamin (THERAGRAN) TABS, Take 1 tablet by mouth daily, Disp: , Rfl:   •  Premarin vaginal " cream, insert 0 5 grams vaginally at bedtime three times a week (MONDAY, WEDNESDAY, AND FRIDAY), Disp: , Rfl:   •  amoxicillin (AMOXIL) 875 mg tablet, Take 875 mg by mouth every 12 (twelve) hours (Patient not taking: Reported on 6/5/2023), Disp: , Rfl:   •  nitrofurantoin (MACROBID) 100 mg capsule, take 1 capsule by mouth daily with food START TAKING 6/19/23 TO 6/21/23 (Patient not taking: Reported on 6/5/2023), Disp: , Rfl:   •  nystatin-triamcinolone (MYCOLOG-II) cream, Use daily for 7 days  , Disp: 30 g, Rfl: 0          • Whom besides the patient is providing clinical information about today's encounter?   o NO ADDITIONAL HISTORIAN (patient alone provided history)    Physical Exam and Assessment/Plan by Diagnosis:    COSMETIC CONSULT, SEBORRHEIC KERATOSES  XEROSIS, BODY    Physical Exam:  • Anatomic Location Affected & Morphological Description:  stuck on tan to skin colored papule on the Right temple, xerotic skin on torso    Additional History of Present Condition:  Patient present for a lesion on the right temple  She states it has been present for a while and brown in color and slightly raised  Assessment and Plan:  Based on a thorough discussion of this condition and the management approach to it (including a comprehensive discussion of the known risks, side effects and potential benefits of treatment), the patient (family) agrees to implement the following specific plan:  • Discussed destruction with electrodessication with light curettage or cryotherapy  • Discussed cosmetic charge: $250 charge for shave removal    • To help smoothen those on body, recommended using over the counter creams containaining gentle acids  Including:   o Skin Fix Renewing cream  o Amlactin's line of lotions (lactic acid containing)   o Cerave SA (salicylic acid containing) lotion or cream  o KP Duty cream (see photo below)  • Choose one of the above to start                              EYELID LAXITY COSMETIC CONSULT    Physical Exam:  Anatomic Location Affected & Morphological Description:  Dermatochalasis with visible bulging of  fat pad on lower lid  Pertinent Positives:  Pertinent Negatives: Additional History of Present Condition:  Pt bothered by appearance, notes she feels uncomfortable wearing contacts because is more visible w/o glasses    Assessment and Plan:  Based on a thorough discussion of this condition and the management approach to it (including a comprehensive discussion of the known risks, side effects and potential benefits of treatment), the patient (family) agrees to implement the following specific plan:  -Discussed blepharoplasty as best treatment to remove excess skin  Would recommend plastics or oculoplastics for consultation   -Referral placed for [address] [place] [address] Plastic Surgery     COSMETIC CONSULT LEG VEINS    Physical Exam:  • Anatomic Location Affected & Morphological Description:  Bilateral lower legs  • Pertinent Positives:  • Pertinent Negatives: Additional History of Present Condition:  Patient states she has many superficial veins that have appeared over the years  Assessment and Plan:  Based on a thorough discussion of this condition and the management approach to it (including a comprehensive discussion of the known risks, side effects and potential benefits of treatment), the patient (family) agrees to implement the following specific plan:  - Discussed Cosmetic treatment with injections:  - Asclera injections discussed - Price for each site $344, discussed need for 4+ treatments   - Recommend Compression stockings 20-30 mmHg for 24-48 hours after treatment  SKIN CARE RECOMMENDATIONS     Morning    1  Wash your face with a gentle cleanser  Some examples are listed below but get one that works for you  Avoid ones with harsh scrubbing beads:  2  Cerave hydrating facial cleanser  3  Cetaphil gentle cleanser   4  LaRoche-Posay hydrating gentle cleanser  5   Neutrogena ultra gentle "hydrating cleanser    2  Apply Vitamin C  Look for \"Ascorbic acid\" as an ingredient  This is the most effective form of Vitamin C  Some examples are listed below but get one that works for you   a  Cerave Skin Renewing Vitamin C Serum:   b  LaRouge-Posay vitamin C serum   c  ISDIN flavo-C Ultraglican Ampules  d  PCA SKIN Vitamin C-Quench Antioxidant Hydrating Facial Serum (formerly known as PCA SKIN C Quench Antioxidant Serum)    3  Apply your peptide treatments  Use these at least once a day on the face, neck, and you can put the remaining on your hands and pat down to your chest   Some examples are listed below but get one that works for you  A  Neocutis Bio Serum Firm Rejuvenating Growth Factor & Peptide Treatment  B  SkinMedica TNS® Advanced+ Serum          4  Please continue to use your moisturizer with SPF 30  Some sunscreen we recommend are:   A  La-Roche Posay Antihelios 50 Mineral    B  Elta MD UV Clear Broad Spectrum SPF 55  C  ISDIN Eryfotona Ageless Broad Spectrum SPF 50  D  Neutrogena Purescreen+ Tinted Mineral Sunscreen    5  Follow with make-up as needed     Night    1  Wash your face with a gentle cleanser  Some examples are listed below but get one that works for you  Avoid ones with harsh scrubbing beads:  a  Cerave hydrating facial cleanser  b  Cetaphil gentle cleanser   c  LaRoche-Posay hydrating gentle cleanser  d  Neutrogena ultra gentle hydrating cleanser    2   +/- Second application of peptides  3  Apply an oil free moisturizer  Some examples are listed below but get one that works for you  A  Neutrogena Hydroboost for EXTRADRY skin   B  The Ordinary Natural Moisturizing Factors HA   C  First Aid Beauty Hydrafirm Night Cream  D  Alastin Ultra nourishing Moisturizer    4  Apply a pea-sized amount of a vitamin A/lightening cream ( TRANEXAMIC ACID 5%/NIACINAMIDE 4%/KOJIC ACID 2%/TRETINOIN 0 025% CREAM)- an even thin layer on your face  Can be drying   You should be contacted within a few " days by the pharmacy ([phone]) as discussed via phone, text, or email  They will take your payment information & your medication will be mailed to you  Start by using once weekly for one week (Monday), then increase to twice weekly (Mon, Fri) the next week and then three times weekly on week three (Mon, Wed, Friday)  You can use this on your hands as well  Avoid the eye area and the curves around your nose and corners of your lips  If you find you are still a bit dry doing the above, try sandwiching the cream between two layers of moisturizer  If too irritating, take a night or two off  For example, if you find you are doing well using 2x weekly and get dry and irritated with 3x weekly, go back to using 2x weekly for a week or two and then re-trail increasing how often you use it  5  Can add a second layer of moisturizer  This is helpful if you find you feel your skin is dry       Scribe Attestation    I,:  Noni Ovalles MA am acting as a scribe while in the presence of the attending physician :       I,:  Krista Sanchez MD personally performed the services described in this documentation    as scribed in my presence :           DO NOT BILL INSURANCE

## 2023-06-20 DIAGNOSIS — Z87.42 HISTORY OF VAGINAL DISCHARGE: ICD-10-CM

## 2023-08-09 NOTE — PRE-PROCEDURE INSTRUCTIONS
Pre-Surgery Instructions:   Medication Instructions   • buPROPion (WELLBUTRIN XL) 150 mg 24 hr tablet Take day of surgery. • CALCIUM PO Stop taking 7 days prior to surgery. • calcium polycarbophil (FIBERCON) 625 mg tablet Stop taking 7 days prior to surgery. • Cyanocobalamin (VITAMIN B 12 PO) Stop taking 7 days prior to surgery. • Diclofenac Sodium (VOLTAREN) 1 % Hold day of surgery. • fish oil-omega-3 fatty acids 1000 MG capsule Stop taking 7 days prior to surgery. • fluticasone (FLONASE) 50 mcg/act nasal spray Uses PRN- OK to take day of surgery   • nystatin-triamcinolone (MYCOLOG-II) cream Hold day of surgery. • tretinoin (RETIN-A) 0.025 % cream Hold day of surgery. Medication instructions for day surgery reviewed. Please use only a sip of water to take your instructed medications. Avoid all over the counter vitamins, supplements and NSAIDS for one week prior to surgery per anesthesia guidelines. Tylenol is ok to take as needed. You will receive a call one business day prior to surgery with an arrival time and hospital directions. If your surgery is scheduled on a Monday, the hospital will be calling you on the Friday prior to your surgery. If you have not heard from anyone by 8pm, please call the hospital supervisor through the hospital  at [phone]. Lina Osborne [phone]). Do not eat or drink anything after midnight the night before your surgery, including candy, mints, lifesavers, or chewing gum. Do not drink alcohol 24hrs before your surgery. Try not to smoke at least 24hrs before your surgery. Follow the pre surgery showering instructions as listed in the [facility] Surgical Experience Booklet” or otherwise provided by your surgeon's office. Do not shave the surgical area 24 hours before surgery. Do not apply any lotions, creams, including makeup, cologne, deodorant, or perfumes after showering on the day of your surgery.      No contact lenses, eye make-up, or artificial eyelashes. Remove nail polish, including gel polish, and any artificial, gel, or acrylic nails if possible. Remove all jewelry including rings and body piercing jewelry. Wear causal clothing that is easy to take on and off. Consider your type of surgery. Keep any valuables, jewelry, piercings at home. Please bring any specially ordered equipment (sling, braces) if indicated. Arrange for a responsible person to drive you to and from the hospital on the day of your surgery. Visitor Guidelines discussed. Call the surgeon's office with any new illnesses, exposures, or additional questions prior to surgery. Please reference your [facility] Surgical Experience Booklet” for additional information to prepare for your upcoming surgery.

## 2023-08-11 ENCOUNTER — ANESTHESIA EVENT (OUTPATIENT)
Dept: PERIOP | Facility: HOSPITAL | Age: 63
End: 2023-08-11
Payer: COMMERCIAL

## 2023-08-14 ENCOUNTER — ANESTHESIA (OUTPATIENT)
Dept: PERIOP | Facility: HOSPITAL | Age: 63
End: 2023-08-14
Payer: COMMERCIAL

## 2023-08-14 ENCOUNTER — HOSPITAL ENCOUNTER (OUTPATIENT)
Facility: HOSPITAL | Age: 63
Setting detail: OUTPATIENT SURGERY
Discharge: HOME | End: 2023-08-14
Attending: OBSTETRICS & GYNECOLOGY | Admitting: OBSTETRICS & GYNECOLOGY
Payer: COMMERCIAL

## 2023-08-14 VITALS
SYSTOLIC BLOOD PRESSURE: 124 MMHG | RESPIRATION RATE: 18 BRPM | DIASTOLIC BLOOD PRESSURE: 69 MMHG | WEIGHT: 120.37 LBS | HEART RATE: 56 BPM | BODY MASS INDEX: 20.06 KG/M2 | HEIGHT: 65 IN | OXYGEN SATURATION: 97 % | TEMPERATURE: 97.9 F

## 2023-08-14 PROBLEM — N81.2 INCOMPLETE UTEROVAGINAL PROLAPSE: Status: ACTIVE | Noted: 2023-08-14

## 2023-08-14 PROCEDURE — C1771 REP DEV, URINARY, W/SLING: HCPCS | Performed by: OBSTETRICS & GYNECOLOGY

## 2023-08-14 PROCEDURE — 51798 US URINE CAPACITY MEASURE: CPT | Performed by: OBSTETRICS & GYNECOLOGY

## 2023-08-14 DEVICE — THE NEUGUIDE™ IS A SINGLE USE TRANS-VAGINAL DEVICE FOR THE REPAIR OF PELVIC ORGAN PROLAPSE (POP) BY ANCHORING SUTURES TO LIGAMENTS OF THE PELVIC FLOOR.
Type: IMPLANTABLE DEVICE | Site: VAGINA | Status: FUNCTIONAL
Brand: NEUGUIDE™

## 2023-08-14 DEVICE — SINGLE INCISION SLING SYSTEM
Type: IMPLANTABLE DEVICE | Site: VAGINA | Status: FUNCTIONAL
Brand: ALTIS

## 2023-08-14 RX ORDER — ONDANSETRON 2 MG/ML
INJECTION INTRAMUSCULAR; INTRAVENOUS AS NEEDED
Status: DISCONTINUED | OUTPATIENT
Start: 2023-08-14 | End: 2023-08-14

## 2023-08-14 RX ORDER — MAGNESIUM HYDROXIDE 1200 MG/15ML
LIQUID ORAL AS NEEDED
Status: DISCONTINUED | OUTPATIENT
Start: 2023-08-14 | End: 2023-08-14 | Stop reason: HOSPADM

## 2023-08-14 RX ORDER — SODIUM CHLORIDE 9 MG/ML
125 INJECTION, SOLUTION INTRAVENOUS CONTINUOUS
Status: DISCONTINUED | OUTPATIENT
Start: 2023-08-14 | End: 2023-08-14 | Stop reason: HOSPADM

## 2023-08-14 RX ORDER — PROMETHAZINE HYDROCHLORIDE 25 MG/ML
6.25 INJECTION, SOLUTION INTRAMUSCULAR; INTRAVENOUS ONCE AS NEEDED
Status: DISCONTINUED | OUTPATIENT
Start: 2023-08-14 | End: 2023-08-14 | Stop reason: HOSPADM

## 2023-08-14 RX ORDER — MEPERIDINE HYDROCHLORIDE 25 MG/ML
12.5 INJECTION INTRAMUSCULAR; INTRAVENOUS; SUBCUTANEOUS ONCE AS NEEDED
Status: DISCONTINUED | OUTPATIENT
Start: 2023-08-14 | End: 2023-08-14 | Stop reason: HOSPADM

## 2023-08-14 RX ORDER — FUROSEMIDE 10 MG/ML
INJECTION INTRAMUSCULAR; INTRAVENOUS AS NEEDED
Status: DISCONTINUED | OUTPATIENT
Start: 2023-08-14 | End: 2023-08-14

## 2023-08-14 RX ORDER — FENTANYL CITRATE/PF 50 MCG/ML
50 SYRINGE (ML) INJECTION
Status: DISCONTINUED | OUTPATIENT
Start: 2023-08-14 | End: 2023-08-14 | Stop reason: HOSPADM

## 2023-08-14 RX ORDER — MIDAZOLAM HYDROCHLORIDE 2 MG/2ML
INJECTION, SOLUTION INTRAMUSCULAR; INTRAVENOUS AS NEEDED
Status: DISCONTINUED | OUTPATIENT
Start: 2023-08-14 | End: 2023-08-14

## 2023-08-14 RX ORDER — CEFAZOLIN SODIUM 2 G/50ML
2000 SOLUTION INTRAVENOUS ONCE
Status: COMPLETED | OUTPATIENT
Start: 2023-08-14 | End: 2023-08-14

## 2023-08-14 RX ORDER — FENTANYL CITRATE 50 UG/ML
INJECTION, SOLUTION INTRAMUSCULAR; INTRAVENOUS AS NEEDED
Status: DISCONTINUED | OUTPATIENT
Start: 2023-08-14 | End: 2023-08-14

## 2023-08-14 RX ORDER — HYDROMORPHONE HCL/PF 1 MG/ML
0.5 SYRINGE (ML) INJECTION
Status: DISCONTINUED | OUTPATIENT
Start: 2023-08-14 | End: 2023-08-14 | Stop reason: HOSPADM

## 2023-08-14 RX ORDER — ONDANSETRON 2 MG/ML
4 INJECTION INTRAMUSCULAR; INTRAVENOUS EVERY 6 HOURS PRN
Status: DISCONTINUED | OUTPATIENT
Start: 2023-08-14 | End: 2023-08-14 | Stop reason: HOSPADM

## 2023-08-14 RX ORDER — KETOROLAC TROMETHAMINE 30 MG/ML
INJECTION, SOLUTION INTRAMUSCULAR; INTRAVENOUS AS NEEDED
Status: DISCONTINUED | OUTPATIENT
Start: 2023-08-14 | End: 2023-08-14

## 2023-08-14 RX ORDER — ACETAMINOPHEN 325 MG/1
975 TABLET ORAL EVERY 6 HOURS PRN
Status: DISCONTINUED | OUTPATIENT
Start: 2023-08-14 | End: 2023-08-14 | Stop reason: HOSPADM

## 2023-08-14 RX ORDER — PROPOFOL 10 MG/ML
INJECTION, EMULSION INTRAVENOUS CONTINUOUS PRN
Status: DISCONTINUED | OUTPATIENT
Start: 2023-08-14 | End: 2023-08-14

## 2023-08-14 RX ORDER — EPHEDRINE SULFATE 50 MG/ML
INJECTION INTRAVENOUS AS NEEDED
Status: DISCONTINUED | OUTPATIENT
Start: 2023-08-14 | End: 2023-08-14

## 2023-08-14 RX ORDER — IBUPROFEN 600 MG/1
600 TABLET ORAL EVERY 6 HOURS PRN
Status: DISCONTINUED | OUTPATIENT
Start: 2023-08-14 | End: 2023-08-14 | Stop reason: HOSPADM

## 2023-08-14 RX ORDER — ONDANSETRON 2 MG/ML
4 INJECTION INTRAMUSCULAR; INTRAVENOUS ONCE AS NEEDED
Status: COMPLETED | OUTPATIENT
Start: 2023-08-14 | End: 2023-08-14

## 2023-08-14 RX ORDER — ACETAMINOPHEN 325 MG/1
975 TABLET ORAL ONCE
Status: COMPLETED | OUTPATIENT
Start: 2023-08-14 | End: 2023-08-14

## 2023-08-14 RX ADMIN — FENTANYL CITRATE 25 MCG: 50 INJECTION INTRAMUSCULAR; INTRAVENOUS at 12:22

## 2023-08-14 RX ADMIN — EPHEDRINE SULFATE 7.5 MG: 50 INJECTION, SOLUTION INTRAVENOUS at 12:09

## 2023-08-14 RX ADMIN — FENTANYL CITRATE 25 MCG: 50 INJECTION INTRAMUSCULAR; INTRAVENOUS at 11:36

## 2023-08-14 RX ADMIN — CEFAZOLIN SODIUM 2000 MG: 2 SOLUTION INTRAVENOUS at 11:28

## 2023-08-14 RX ADMIN — SODIUM CHLORIDE: 0.9 INJECTION, SOLUTION INTRAVENOUS at 11:52

## 2023-08-14 RX ADMIN — FUROSEMIDE 10 MG: 10 INJECTION, SOLUTION INTRAVENOUS at 12:26

## 2023-08-14 RX ADMIN — FENTANYL CITRATE 25 MCG: 50 INJECTION INTRAMUSCULAR; INTRAVENOUS at 11:31

## 2023-08-14 RX ADMIN — PROPOFOL 100 MCG/KG/MIN: 10 INJECTION, EMULSION INTRAVENOUS at 11:31

## 2023-08-14 RX ADMIN — MIDAZOLAM 2 MG: 1 INJECTION INTRAMUSCULAR; INTRAVENOUS at 11:28

## 2023-08-14 RX ADMIN — ONDANSETRON 4 MG: 2 INJECTION INTRAMUSCULAR; INTRAVENOUS at 11:31

## 2023-08-14 RX ADMIN — ONDANSETRON 4 MG: 2 INJECTION INTRAMUSCULAR; INTRAVENOUS at 13:38

## 2023-08-14 RX ADMIN — ACETAMINOPHEN 325MG 975 MG: 325 TABLET ORAL at 09:42

## 2023-08-14 RX ADMIN — SODIUM CHLORIDE 125 ML/HR: 0.9 INJECTION, SOLUTION INTRAVENOUS at 13:54

## 2023-08-14 RX ADMIN — SODIUM CHLORIDE 125 ML/HR: 0.9 INJECTION, SOLUTION INTRAVENOUS at 09:42

## 2023-08-14 RX ADMIN — KETOROLAC TROMETHAMINE 30 MG: 30 INJECTION, SOLUTION INTRAMUSCULAR at 13:00

## 2023-08-14 NOTE — DISCHARGE INSTRUCTIONS
Apical Vaginal Repair (EnPlace System)    WHAT YOU NEED TO KNOW:   An apical vaginal repair is a procedure to lift the cervix, vagina  and surrounding structures to the normal anatomical position. This can help prevent you from having a bulge sensation in the vagina. The procedure is also called an EnPlace procedure. **** VAGINAL PACKING WAS PLACED IN THE VAGINA- This packing will need to be removed at home on POD #3 ****      DISCHARGE INSTRUCTIONS:   Medicines:   Pain medicine: You may need medicine to take away or decrease pain. Learn how to take your medicine. Ask what medicine and how much you should take. Be sure you know how, when, and how often to take it. Do not wait until the pain is severe before you take your medicine. Tell caregivers if your pain does not decrease. Pain medicine can make you dizzy or sleepy. Prevent falls by calling someone when you get out of bed or if you need help. Antibiotics: This medicine is given to fight or prevent an infection caused by bacteria. Always take your antibiotics exactly as ordered by your healthcare provider. Do not stop taking your medicine unless directed by your healthcare provider. Never save antibiotics or take leftover antibiotics that were given to you for another illness. Take your medicine as directed. Contact your healthcare provider if you think your medicine is not helping or if you have side effects. Tell him or her if you are allergic to any medicine. Keep a list of the medicines, vitamins, and herbs you take. Include the amounts, and when and why you take them. Bring the list or the pill bottles to follow-up visits. Carry your medicine list with you in case of an emergency. Follow up with your healthcare provider as directed:  Write down your questions so you remember to ask them during your visits. Self-care:   Vaginal packing: Vaginal packing was placed into the vagina.  You will remove this packing on POD #3 early in the morning. (This is the 3rd day after your surgery). The office will call to remind you to remove this packing. Sex:  Do not have sex until your healthcare provider says it is okay. Kegel exercises: To do kegel exercises, squeeze your pelvic floor muscles for 5 to 10 seconds, then release. Regular kegel exercises will help your pelvic floor muscles become stronger. This will help prevent you from leaking urine. Ask your healthcare provider when to start these exercises and how often to do them. Sanitary pad:  Change your sanitary pad regularly. Keep track of how often you change the pad. Rea catheter:  Keep the bag below your waist. This will help prevent infection and other problems caused by urine flowing back into your bladder. Do not pull on the catheter because this can cause pain and bleeding, and the catheter could come out. Keep the catheter tubing free of kinks so your urine will flow into the bag. Your healthcare provider will remove the catheter as soon as possible, to help prevent infection. Wound care:  When you are allowed to bathe or shower, carefully wash your vaginal area with soap and water. Do not put pressure on your abdomen: This will help prevent damage to your surgery area. Do not strain, lift heavy objects, or stand for a very long time. Do not perform strenuous exercises, such as running and weight lifting. Activity:  You may need to start walking within a few days after your procedure. Ask your healthcare provider when to start and how long you should walk. Ask about any other exercises that may be right for you. Support socks: You may need to wear support socks. These are tight socks that help increase the circulation in your legs until you are more active. This helps prevent blood clots. Contact your healthcare provider if:   You soak a sanitary pad with blood every hour for 4 hours.     You have vaginal pain that does not go away even after you take pain medicine. You have pus or a foul-smelling discharge from your genital area. You see blood in your urine. You have pain during sex. You have a fever, chills, a cough, or feel weak and achy. You have nausea and vomiting. You have questions or concerns about your condition or care. Seek care immediately or call 911 if: You feel something is bulging out into your vagina or rectum and not going back in. You cannot urinate. Your arm or leg feels warm, tender, and painful. It may look swollen and red. You suddenly feel lightheaded and short of breath. You have chest pain. You may have more pain when you take a deep breath or cough. You may cough up blood. © 2017 5 [facility] Information is for End User's use only and may not be sold, redistributed or otherwise used for commercial purposes. All illustrations and images included in CareNotes® are the copyrighted property of [employer].D.A.M., Inc. or [employer]. The above information is an  only. It is not intended as medical advice for individual conditions or treatments. Talk to your doctor, nurse or pharmacist before following any medical regimen to see if it is safe and effective for you. Urethral Sling Procedure   WHAT YOU NEED TO KNOW:   A bladder sling procedure is surgery to treat urinary incontinence in women. The sling acts as a hammock to keep your urethra in place and hold it closed when your bladder is full. You may have vaginal bleeding or discharge for up to a week after your surgery. Use sanitary pads. Do not use tampons. You may have some pelvic discomfort or trouble urinating. DISCHARGE INSTRUCTIONS:   Call 911 for any of the following: You have sudden trouble breathing. Seek care immediately if:   Your bleeding gets worse. You have yellow or foul smelling discharge from your vagina. You cannot urinate, or you are urinating less than what is normal for you.     You feel confused. Contact your healthcare provider if:   You have a fever. You do not feel like you are able to empty your bladder completely when you urinate. You feel the need to urinate very suddenly. You have burning or stinging when you urinate. You have blood in your urine. Your skin is itchy, swollen, or you have a rash. You have questions or concerns about your condition or care. Medicines:   Prescription pain medicine  may be given. Ask your how to take this medicine safely. Take your medicine as directed. Contact your healthcare provider if you think your medicine is not helping or if you have side effects. Tell him or her if you are allergic to any medicine. Keep a list of the medicines, vitamins, and herbs you take. Include the amounts, and when and why you take them. Bring the list or the pill bottles to follow-up visits. Carry your medicine list with you in case of an emergency. Self-catheterization:  You may need to put a catheter into your bladder after you urinate to empty any remaining urine. A catheter is a small rubber tube used to drain urine. Healthcare providers will teach you how to put the catheter in safely. This may be needed until you are completely emptying your bladder. Rea catheter: You may have a Rea catheter for a short period of time. The Rea is a tube put into your bladder to drain urine into a bag. Keep the bag below your waist. This will prevent urine from flowing back into your bladder and causing an infection or other problems. Also, keep the tube free of kinks so the urine will drain properly. Do not pull on the catheter. This can cause pain and bleeding, and may cause the catheter to come out. Activity:  Do not lift heavy objects for 6 weeks after your procedure. Do not have intercourse for 4 to 6 weeks. Do not use a tampon for 4 weeks. Ask your healthcare provider when you can return to work or your usual activities.   Do pelvic muscle exercises: These are also called Kegel exercises. These exercises help strengthen your pelvic muscles and help prevent urine leakage. Tighten the muscles of your pelvis and hold them tight for 5 seconds, then relax for 5 seconds. Gradually work up to tightening them for 10 seconds and relaxing for 10 seconds. Do this 3 times each day. Keep a record:  Keep a record of when you urinate and if you leak any urine. Write down what you were doing when you leaked urine, such as coughing or sneezing. Bring the log to your follow-up visits. Prevent constipation:  Drink liquids as directed. You may need to drink more water than usual to soften your bowel movements. Eat a variety of healthy foods, especially fruit and foods high in fiber. You may need to use an over-the-counter bowel movement softener. Follow up with your healthcare provider as directed: You may need a test to check how much urine remains in your bladder after you urinate. This will help show how the sling is working. Write down your questions so you remember to ask them during your visits. © 2017 [address] [place] [address] [employer] Information is for End User's use only and may not be sold, redistributed or otherwise used for commercial purposes. All illustrations and images included in CareNotes® are the copyrighted property of [employer].A[employer]M., Inc. or [employer]. The above information is an  only. It is not intended as medical advice for individual conditions or treatments. Talk to your doctor, nurse or pharmacist before following any medical regimen to see if it is safe and effective for you.

## 2023-08-14 NOTE — ANESTHESIA POSTPROCEDURE EVALUATION
Post-Op Assessment Note    CV Status:  Stable  Pain Score: 0    Pain management: adequate     Mental Status:  Alert and awake   Hydration Status:  Euvolemic   PONV Controlled:  Controlled   Airway Patency:  Patent   Two or more mitigation strategies used for obstructive sleep apnea   Post Op Vitals Reviewed: Yes      Staff: Anesthesiologist, CRNA         No notable events documented.     BP      Temp      Pulse     Resp      SpO2      /74   Pulse 57   Temp 97.9 °F (36.6 °C) (Temporal)   Resp 18   Ht 5' 5" (1.651 m)   Wt 54.6 kg (120 lb 5.9 oz)   SpO2 97%   BMI 20.03 kg/m²

## 2023-08-14 NOTE — ANESTHESIA PREPROCEDURE EVALUATION
Procedure:  COLPOSUSPENSION VAGINAL EXTRAPERITONEAL(ENPLACE) (Vagina )  A&P COLPORRHAPHY (Perineum)  PV SLING (Vagina )  CYSTO (Bladder)    Relevant Problems   CARDIO   (+) Mixed hyperlipidemia      MUSCULOSKELETAL   (+) Primary osteoarthritis of both hands      NEURO/PSYCH   (+) Anxiety   (+) Reactive depression      Genitourinary   (+) Incomplete uterovaginal prolapse      Other   (+) Lyme disease        Physical Exam    Airway    Mallampati score: II  TM Distance: >3 FB  Neck ROM: full     Dental   No notable dental hx     Cardiovascular  Rhythm: regular, Rate: normal, Cardiovascular exam normal    Pulmonary  Pulmonary exam normal Breath sounds clear to auscultation,     Other Findings        Anesthesia Plan  ASA Score- 2     Anesthesia Type- IV sedation with anesthesia with ASA Monitors. Additional Monitors:   Airway Plan:     Comment: GA prn. Plan Factors-    Chart reviewed. EKG reviewed. Patient summary reviewed. Patient is not a current smoker. Patient not instructed to abstain from smoking on day of procedure. Patient did not smoke on day of surgery. There is medical exclusion for perioperative obstructive sleep apnea risk education. Induction- intravenous. Postoperative Plan-     Informed Consent- Anesthetic plan and risks discussed with patient and spouse Cristi Gaming).

## 2023-08-14 NOTE — OP NOTE
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 Bilateral SCDs were placed in the lower extremities for DVT prevention prior to the institution of anesthesia.     The patient was identified in the holding area by the operating room staff and attending physician. She was taken to the operating room where anesthesia was instituted without complications. She was placed in the dorsal lithotomy position with the legs in [address] [place] with care taken to avoid excessive flexion or extension of her lower extremities. The patient was prepped and draped in the usual sterile fashion. A Rea catheter was inserted. A finger was placed in the lateral vagina, with careful palpation of the ischial spines and sacrospinous ligaments bilaterally. The right finger sleeve was then applied to the surgeon's index finger. These landmarks were then again palpated with the finger sleeve in place. Location along the sacrospinous ligament approximately in the middle 1/3 of the ligament as well as the lower 1/3 of the ligament was carefully palpated, and the trocar device loaded with the 0 Prolene suture was brought into the field. The trocar device was inserted into the finger sleeve port and the device was used to place the 0 Prolene suture at this location while the surgeons finger was firmly pressed against the ligament, approximately 2 cm medial to the ischial spine using the sacrum as a guide. Once the suture was anchored in place, which was confirmed by tenting of the ligament with gentle tugging, all instruments were removed from the vagina. Rectal exam confirmed proper location as well. She had the exact same procedure performed on the contralateral side. Next local was injected into the cervico-vaginal junction and a 3 cm anterior vaginal epithelial incision was made along the anterior cervico-vaginal junction, until the cervical stroma was encountered. Next, one strand of the Prolene suture was passed backwards using a large pina needle through its entering point in the vaginal wall and medially through the cervical stromal tissue. The second strand on the same side was passed caudally and proximal to the cervical os using the same technique.  The exact same procedure was performed on the contralateral side. Attention was turned to the anterior wall where a persistent cystocele was noted. Two Allis clamps were applied horizontally along the vaginal incision to grasp the dependent portion of the cystocele for traction. The epithelium was further  from the vaginal muscularis sharply with tenotomy and metzenbaum scissors and bluntly with peanut sponges and a raytec. Excess vaginal mucosal skin was trimmed. The vaginal wall was then plicated in a vertical imbricating fashion using 2-0 PDS. We then began closure of the anterior vaginal epithelial incision with a 2-0 Vicryl sutures in a running locked stitch from lateral to medial with two separate sutures from each vaginal apex. The incision was intentionally left open after the first few throws from each side. The EnPlace prolene sutures were then tied down separately, one at a time until proper apical support was obtained and then they were tied together pushing the prolapse up to the anatomic position of the vaginal apex. The 2-0 PDS stitches were then tied down. We then finished closure of the anterior vaginal epithelial incision with the two 2-0 Vicryl sutures in a running locked stitch from lateral to medial. The incision was inspected and noted to be hemostatic. We turned our attention for performance of the single incision sling. At this time, the mid urethral zone was identified in reference to the Rea catheter and urethral meatus and local anesthetic solution was injected into the anterior vaginal wall at the mid urethral level for hydrodissection and vasoconstriction. Next, local was injected at the midline and to the left and right of midline directing the infiltration laterally towards the cephalad aspect of the inferior pubic ramus bilaterally.  Care was taken to ensure that the sulcus was flattened and free of infiltration to minimize chance for buttonholing or tapping too close to the anterolateral sulcus. After completion of hydrodissection, 2 Allis clamps were used to grasp the anterior vaginal wall for traction. A 1.5 cm incision was made to the midline. Two Allis clamps were then placed on each cut edge of the incision for stabilization. Tenotomy scissors were used to create a small vaginal tunnel with sharp and blunt dissection above the anterior vaginal wall directed laterally towards the cephalad aspect of the inferior pubic ramus bilaterally. Dissection was carried out to the edge of the bone itself but no dissection into the obturator internus muscle. Once the dissection was complete, the sling was placed. The Altis system was used. An index finger was placed in the vagina for guidance. The Altis trocar was placed into the pre-dissected tract. The handle was held in horizontal slight upward canting to avoid buttonholing of the sulcus. Cephalad drift was used to allow passage around the inferior pubic ramus. A thumb was placed on the heel of the introducer to allow a push/pivot maneuver to place a fixed anchor into the obturator internus muscle membrane complex on the patient's left side. Proper handle deviation confirmed proper anchor placement, as well as a gentle tugging on the sling. Once the introducer was removed, it also confirmed proper anchor placement. The exact same sequence of steps was repeated on the patient's right side with adjustable anchor. Once it was complete, the introducer was removed and gentle tugging again confirmed proper anchor placement. The Rea catheter was then used to drain the bladder and then it was removed and a diagnostic cystoscopy was performed by instilling 300 mL of fluid into the bladder. Both ureters were effluxing normally and there were no lacerations or injury in the lower urinary tract. No leakage of urine was noted on patient induced cough. The tensioning suture was used to adjust the tape until flush with the urethra.  After appropriate tensioning, the tensioning suture was cut and the vaginal incision was closed with 2-0 Vicryl suture in a running locked stitch. At this time, we placed the Rea back in the bladder. Attention was then turned to the posterior compartment where 2 Allis clamps were placed in the posterior fourchette over the mucocutaneous border to reduce the markedly relaxed vaginal outlet. Dilute Marcaine solution was injected into the perineum. A con-shaped incision was made extending from the vaginal mucosa onto the perineum. The overlying skin was removed en bloc. We then began closure of the posterior colporrhaphy with a 2-0 Vicryl suture in a running locked stitch. We reapproximated the perineal body with a 0-Vicryl interrupted suture. We closed the remainder of the colporrhaphy to the level of the hymen. We closed the perineal skin with 2-0 Vicryl in a subcuticular fashion. No bladder, ureteral, viscus, or solid organ injury were noted at the end of the procedure. Irrigation was performed. We completed the procedure. Vaginal packing was placed in the vagina. There were no complications. The sponge, needle and instrument count were correct x2. The patient tolerated the procedure well and went to the recovery room in stable condition and she is stable at the moment of this dictation.            Patient Disposition:  PACU         SIGNATURE: Jerson Keita MD  DATE: August 14, 2023  TIME: 1:10 PM

## 2023-11-21 ENCOUNTER — TELEPHONE (OUTPATIENT)
Age: 63
End: 2023-11-21

## 2024-06-13 ENCOUNTER — TELEPHONE (OUTPATIENT)
Age: 64
End: 2024-06-13

## 2024-06-13 DIAGNOSIS — R23.4 SKIN TEXTURE CHANGES: ICD-10-CM

## 2024-06-13 NOTE — TELEPHONE ENCOUNTER
Patient had seen Dr Díaz 6/5/23 and was prescribed medication of Tretinoin 0.025% cream.  The last refill was to be done by 6/5/24 and she forgot.    Looking to have refill of medication put in while waiting for next yearly appointment.    Had spoken with Dr Díaz about vein removal and advised to reach out to     [email or web address]

## 2024-06-13 NOTE — TELEPHONE ENCOUNTER
Called and left a message for the patient letting her know we sent in a new script for the tretinoin 0.025% cream to the [facility] in [place]

## 2024-08-11 DIAGNOSIS — Z87.42 HISTORY OF VAGINAL DISCHARGE: ICD-10-CM

## 2024-08-12 RX ORDER — NYSTATIN AND TRIAMCINOLONE ACETONIDE 100000; 1 [USP'U]/G; MG/G
CREAM TOPICAL
Qty: 30 G | Refills: 0 | Status: SHIPPED | OUTPATIENT
Start: 2024-08-12

## 2025-02-12 ENCOUNTER — OFFICE VISIT (OUTPATIENT)
Dept: DERMATOLOGY | Facility: CLINIC | Age: 65
End: 2025-02-12
Payer: COMMERCIAL

## 2025-02-12 DIAGNOSIS — L57.0 KERATOSIS, ACTINIC: ICD-10-CM

## 2025-02-12 DIAGNOSIS — R23.4 SKIN TEXTURE CHANGES: ICD-10-CM

## 2025-02-12 DIAGNOSIS — L82.1 SEBORRHEIC KERATOSIS: Primary | ICD-10-CM

## 2025-02-12 PROCEDURE — 99214 OFFICE O/P EST MOD 30 MIN: CPT | Performed by: DERMATOLOGY

## 2025-02-12 RX ORDER — TRETINOIN 0.5 MG/G
CREAM TOPICAL
Qty: 45 G | Refills: 11 | Status: SHIPPED | OUTPATIENT
Start: 2025-02-12

## 2025-02-12 NOTE — PROGRESS NOTES
"[facility] Dermatology Clinic Note     Patient Name: Elliot Roa  Encounter Date: 02/12/2025     Have you been cared for by a [facility] Dermatologist in the last 3 years and, if so, which description applies to you?    Yes.  I have been here within the last 3 years, and my medical history has NOT changed since that time.  I am FEMALE/of child-bearing potential.    REVIEW OF SYSTEMS:  Have you recently had or currently have any of the following? No changes in my recent health.   PAST MEDICAL HISTORY:  Have you personally ever had or currently have any of the following?  If \"YES,\" then please provide more detail. No changes in my medical history.   HISTORY OF IMMUNOSUPPRESSION: Do you have a history of any of the following:  Systemic Immunosuppression such as Diabetes, Biologic or Immunotherapy, Chemotherapy, Organ Transplantation, Bone Marrow Transplantation or Prednisone?  No     Answering \"YES\" requires the addition of the dotphrase \"IMMUNOSUPPRESSED\" as the first diagnosis of the patient's visit.   FAMILY HISTORY:  Any \"first degree relatives\" (parent, brother, sister, or child) with the following?    No changes in my family's known health.   PATIENT EXPERIENCE:    Do you want the Dermatologist to perform a COMPLETE skin exam today including a clinical examination under the \"bra and underwear\" areas?  NO  If necessary, do we have your permission to call and leave a detailed message on your Preferred Phone number that includes your specific medical information?  Yes      Allergies   Allergen Reactions    Codeine GI Intolerance      Current Outpatient Medications:     buPROPion (WELLBUTRIN XL) 150 mg 24 hr tablet, Take 150 mg by mouth every morning, Disp: , Rfl:     CALCIUM PO, Take by mouth, Disp: , Rfl:     calcium polycarbophil (FIBERCON) 625 mg tablet, Take 625 mg by mouth daily, Disp: , Rfl:     cetirizine (ZyrTEC) 10 mg tablet, Take 10 mg by mouth daily, Disp: , Rfl:     Cyanocobalamin (VITAMIN B 12 PO), " Take by mouth, Disp: , Rfl:     Diclofenac Sodium (VOLTAREN) 1 %, apply 2 grams topically to affected area four times a day, Disp: , Rfl:     fish oil-omega-3 fatty acids 1000 MG capsule, Take by mouth, Disp: , Rfl:     fluticasone (FLONASE) 50 mcg/act nasal spray, 2 sprays into each nostril daily, Disp: , Rfl:     nitrofurantoin (MACROBID) 100 mg capsule, take 1 capsule by mouth daily with food START TAKING 6/19/23 TO 6/21/23 (Patient not taking: Reported on 6/5/2023), Disp: , Rfl:     nystatin-triamcinolone (MYCOLOG-II) cream, apply topically to affected area daily for 7 days, Disp: 30 g, Rfl: 0    Premarin vaginal cream, insert 0.5 grams vaginally at bedtime three times a week (MONDAY, WEDNESDAY, AND FRIDAY) (Patient not taking: Reported on 8/9/2023), Disp: , Rfl:     tretinoin (RETIN-A) 0.025 % cream, Apply topically daily at bedtime, Disp: 30 g, Rfl: 3          Whom besides the patient is providing clinical information about today's encounter?   NO ADDITIONAL HISTORIAN (patient alone provided history)    Physical Exam and Assessment/Plan by Diagnosis:    SKIN TEXTURE  Physical Exam:  Anatomic Location Affected:  Face  Morphological Description:  Fine lines   Pertinent Positives:  Pertinent Negatives:    Additional History of Present Condition:  patient has been using tretinoin 0.025% nightly with improvement     Assessment and Plan:  Based on a thorough discussion of this condition and the management approach to it (including a comprehensive discussion of the known risks, side effects and potential benefits of treatment), the patient (family) agrees to implement the following specific plan:  tretinoin 0.05% apply topically once nightly  Introduce the new retinoid slowly.   - for a couple weeks: 0.025 for 6 nights and 0.05 for 1 night  - then, for a couple weeks: 0.025 for 5 nights and 0.05 for 2 nights  - continue transitioning slowly until you are off the 0.025 and using the 0.05 every night      ACTINIC  "KERATOSIS    Physical Exam:  Anatomic Location Affected:  right upper lip  Morphological Description:  Scaly pink papules  Pertinent Positives:  Pertinent Negatives:      Assessment and Plan:  Based on a thorough discussion of this condition and the management approach to it (including a comprehensive discussion of the known risks, side effects and potential benefits of treatment), the patient (family) agrees to implement the following specific plan:  When outside we recommend using a wide brim hat, sunglasses, long sleeve and pants, sunscreen with SPF 30+ with reapplication every 2 hours, or SPF specific clothing   monitor for any changes in size, thickness, bleeding; discussed risk of skin cancer, patient prefers to monitor`  Dicussed efudex vs cryotherapy- patient would like to proceed with cryotherapy but would like to wait till June when she will be out of work     SEBORRHEIC KERATOSIS; NON-INFLAMED    Physical Exam:  Anatomic Location Affected:  face  Morphological Description:  Flat and raised, waxy, smooth to warty textured, yellow to brownish-grey to dark brown to blackish, discrete, \"stuck-on\" appearing papules.  Pertinent Positives:  Pertinent Negatives:    Additional History of Present Condition:      Assessment and Plan:  Based on a thorough discussion of this condition and the management approach to it (including a comprehensive discussion of the known risks, side effects and potential benefits of treatment), the patient (family) agrees to implement the following specific plan:  Monitor for changes  Benign, reassured  $150 to treat up to 10 lesions, and if over 10 lesions, then additional $10/lesion thereafter.        Seborrheic Keratosis  A seborrheic keratosis is a harmless warty spot that appears during adult life as a common sign of skin aging.  Seborrheic keratoses can arise on any area of skin, covered or uncovered, with the usual exception of the palms and soles. They do not arise from mucous " "membranes. Seborrheic keratoses can have highly variable appearance.      Seborrheic keratoses are extremely common. It has been estimated that over 90% of adults over the age of 60 years have one or more of them. They occur in males and females of all races, typically beginning to erupt in the 30s or 40s. They are uncommon under the age of 20 years.  The precise cause of seborrhoeic keratoses is not known.  Seborrhoeic keratoses are considered degenerative in nature. As time goes by, seborrheic keratoses tend to become more numerous. Some people inherit a tendency to develop a very large number of them; some people may have hundreds of them.      There is no easy way to remove multiple lesions on a single occasion.  Unless a specific lesion is \"inflamed\" and is causing pain or stinging/burning or is bleeding, most insurance companies do not authorize treatment.  Scribe Attestation      I,:  Mariajose Karimi am acting as a scribe while in the presence of the attending physician.:       I,:  Jazz Whitlock MD personally performed the services described in this documentation    as scribed in my presence.:            "

## 2025-06-12 ENCOUNTER — OFFICE VISIT (OUTPATIENT)
Dept: DERMATOLOGY | Facility: CLINIC | Age: 65
End: 2025-06-12
Payer: COMMERCIAL

## 2025-06-12 VITALS — BODY MASS INDEX: 19.29 KG/M2 | WEIGHT: 120 LBS | HEIGHT: 66 IN
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
## 2025-06-12 DIAGNOSIS — L57.0 ACTINIC KERATOSIS: Primary | ICD-10-CM

## 2025-06-12 DIAGNOSIS — L70.9 ACNE, UNSPECIFIED ACNE TYPE: ICD-10-CM

## 2025-06-12 PROCEDURE — 17000 DESTRUCT PREMALG LESION: CPT | Performed by: NURSE PRACTITIONER

## 2025-06-12 PROCEDURE — 99213 OFFICE O/P EST LOW 20 MIN: CPT | Performed by: NURSE PRACTITIONER

## 2025-06-12 PROCEDURE — 17003 DESTRUCT PREMALG LES 2-14: CPT | Performed by: NURSE PRACTITIONER

## 2025-06-12 RX ORDER — TRIAMCINOLONE ACETONIDE 1 MG/G
CREAM TOPICAL
Qty: 45 G | Refills: 0 | Status: SHIPPED | OUTPATIENT
Start: 2025-06-12

## 2025-06-12 RX ORDER — TRETINOIN 0.25 MG/G
CREAM TOPICAL
Qty: 45 G | Refills: 3 | Status: SHIPPED | OUTPATIENT
Start: 2025-06-12

## 2025-06-12 RX ORDER — TRETINOIN 0.25 MG/G
CREAM TOPICAL
COMMUNITY
End: 2025-06-12 | Stop reason: SDUPTHER

## 2025-06-12 NOTE — PATIENT INSTRUCTIONS
Exam and clinical history consistent with actinic keratoses  - Discussed that these lesions are considered premalignant with the potential to evolve into squamous cell carcinoma.   - Discussed treatment options, which may inclue liquid nitrogen destruction, topical immunotherapy including risks, benefits  - Patient counseled to return to the office in 4-6 weeks after completion of treatment for recheck if not resolved at which time retreatment or biopsy to rule out SCC will be determined based on clinic findings  Schedule yearly follow up for March. Patient will call back  Apply Vaseline      OPTION 1:    PROCEDURE:  DESTRUCTION OF PRE-MALIGNANT LESIONS    - After a thorough discussion of treatment options and risk/benefits/alternatives (including but not limited to local pain, scarring, dyspigmentation, blistering, and possible superinfection), verbal and written consent were obtained and the aforementioned lesions were treated on with cryotherapy using liquid nitrogen x 1 cycle for 5-10 seconds.    TOTAL NUMBER of 1 pre-malignant lesions were treated today on the ANATOMIC LOCATION: above upper lip.     The patient tolerated the procedure well, and after-care instructions were provided.

## 2025-06-12 NOTE — PROGRESS NOTES
"[facility] Dermatology Clinic Note     Patient Name: Elliot Roa  Encounter Date: 6/12/25       Have you been cared for by a [facility] Dermatologist in the last 3 years and, if so, which description applies to you? Yes. I have been here within the last 3 years, and my medical history has NOT changed since that time. I am not of child-bearing potential.     REVIEW OF SYSTEMS:  Have you recently had or currently have any of the following? No changes in my recent health.   PAST MEDICAL HISTORY:  Have you personally ever had or currently have any of the following?  If \"YES,\" then please provide more detail. No changes in my medical history.   HISTORY OF IMMUNOSUPPRESSION: Do you have a history of any of the following:  Systemic Immunosuppression such as Diabetes, Biologic or Immunotherapy, Chemotherapy, Organ Transplantation, Bone Marrow Transplantation or Prednisone?  No     Answering \"YES\" requires the addition of the dotphrase \"IMMUNOSUPPRESSED\" as the first diagnosis of the patient's visit.   FAMILY HISTORY:  Any \"first degree relatives\" (parent, brother, sister, or child) with the following?    No changes in my family's known health.   PATIENT EXPERIENCE:    Do you want the Dermatologist to perform a COMPLETE skin exam today including a clinical examination under the \"bra and underwear\" areas?  NO  If necessary, do we have your permission to call and leave a detailed message on your Preferred Phone number that includes your specific medical information?  Yes      Allergies[1] Current Medications[2]          Whom besides the patient is providing clinical information about today's encounter?   NO ADDITIONAL HISTORIAN (patient alone provided history)    Physical Exam and Assessment/Plan by Diagnosis: Patient last evaluated by Dr. Whitlock on 2/12/25.  Plan was to treat AK, but patient had deferred and presents today for re-evaluation.  Denies any personal skin cancer history.      ACTINIC KERATOSES  - Relevant exam: " On the above upper lip are scaly pink macules without palpable dermal component    - Exam and clinical history consistent with actinic keratoses  - Discussed that these lesions are considered premalignant with the potential to evolve into squamous cell carcinoma.   - Discussed treatment options, which may inclue liquid nitrogen destruction, topical immunotherapy including risks, benefits  - Patient counseled to return to the office in 4-6 weeks after completion of treatment for recheck if not resolved at which time retreatment or biopsy to rule out SCC will be determined based on clinic findings  -Apply Vaseline as needed  -Schedule yearly follow up for March 2026. Patient will call back    PROCEDURE:  DESTRUCTION OF PRE-MALIGNANT LESIONS  Consent signed today    - After a thorough discussion of treatment options and risk/benefits/alternatives (including but not limited to local pain, scarring, dyspigmentation, blistering, and possible superinfection), verbal and written consent were obtained and the aforementioned lesions were treated on with cryotherapy using liquid nitrogen x 1 cycle for 5-10 seconds.    TOTAL NUMBER of 2 pre-malignant lesions were treated today on the ANATOMIC LOCATION: above right upper lip.     The patient tolerated the procedure well, and after-care instructions were provided.     VARICOSE/SPIDER VEINS    Physical Exam:  Anatomic Location Affected:  Bilateral lower legs    Additional History of Present Condition:  Patient evaluated by Dr. Díaz for cosmetic consult on 6/5/23.  She had discussed sclerotherapy, and patient interested in pursuing.    Assessment and Plan:  Based on a thorough discussion of this condition and the management approach to it (including a comprehensive discussion of the known risks, side effects and potential benefits of treatment), the patient (family) agrees to implement the following specific plan:  Provided patient information to [facility] to schedule appointment.   She is aware of cosmetic fees.      Scribe Attestation      I,:  Leslie Beltre MA am acting as a scribe while in the presence of the attending physician.:       I,:  JAYDEN Lockhart personally performed the services described in this documentation    as scribed in my presence.:                [1]   Allergies  Allergen Reactions    Codeine GI Intolerance   [2]   Current Outpatient Medications:     buPROPion (WELLBUTRIN XL) 150 mg 24 hr tablet, Take 150 mg by mouth every morning, Disp: , Rfl:     CALCIUM PO, Take by mouth, Disp: , Rfl:     calcium polycarbophil (FIBERCON) 625 mg tablet, Take 625 mg by mouth daily, Disp: , Rfl:     cetirizine (ZyrTEC) 10 mg tablet, Take 10 mg by mouth daily, Disp: , Rfl:     Cyanocobalamin (VITAMIN B 12 PO), Take by mouth, Disp: , Rfl:     Diclofenac Sodium (VOLTAREN) 1 %, apply 2 grams topically to affected area four times a day, Disp: , Rfl:     fish oil-omega-3 fatty acids 1000 MG capsule, Take by mouth, Disp: , Rfl:     fluticasone (FLONASE) 50 mcg/act nasal spray, 2 sprays into each nostril daily, Disp: , Rfl:     nitrofurantoin (MACROBID) 100 mg capsule, take 1 capsule by mouth daily with food START TAKING 6/19/23 TO 6/21/23 (Patient not taking: Reported on 6/5/2023), Disp: , Rfl:     nystatin-triamcinolone (MYCOLOG-II) cream, apply topically to affected area daily for 7 days, Disp: 30 g, Rfl: 0    Premarin vaginal cream, insert 0.5 grams vaginally at bedtime three times a week (MONDAY, WEDNESDAY, AND FRIDAY) (Patient not taking: Reported on 8/9/2023), Disp: , Rfl:     tretinoin (RETIN-A) 0.05 % cream, Apply topically daily at bedtime, Disp: 45 g, Rfl: 11

## 2025-06-13 ENCOUNTER — TELEPHONE (OUTPATIENT)
Age: 65
End: 2025-06-13

## 2025-06-13 NOTE — TELEPHONE ENCOUNTER
Patient called to review clinical information in regards to her appointment yesterday. She wasn't sure what the triamcinolone cream was for. I informed her that the triamcinolone is for the bug bite Associated Diagnoses:  Arthropod bite, initial encounter [W57.XXXA]. She should apply it only for that area.     She received three 15 g tubes instead of a 45 g tube. I advised her to use one and if the other is not needed to save it and it's usually good for a year per expiration date.    She also asked about the her lip area where cryotherapy was done. She has a blister. I advised her to only apply Vaseline until it heals. Healing may take up to 2 weeks.     Patient had no further questions.

## (undated) DEVICE — NEEDLE HYPO 22G X 1-1/2 IN

## (undated) DEVICE — SCD SEQUENTIAL COMPRESSION COMFORT SLEEVE MEDIUM KNEE LENGTH: Brand: KENDALL SCD

## (undated) DEVICE — INTENDED FOR TISSUE SEPARATION, AND OTHER PROCEDURES THAT REQUIRE A SHARP SURGICAL BLADE TO PUNCTURE OR CUT.: Brand: BARD-PARKER SAFETY BLADES SIZE 11, STERILE

## (undated) DEVICE — ELECTROSURGICAL DEVICE HOLSTER;FOR USE WITH MAXIMUM PEAK VOLTAGE OF 4000 V: Brand: FORCE TRIVERSE

## (undated) DEVICE — SPONGE CHERRY 1/2IN

## (undated) DEVICE — BULB SYRINGE,IRRIGATION WITH PROTECTIVE CAP: Brand: DOVER

## (undated) DEVICE — EXIDINE 4 PCT

## (undated) DEVICE — SUT VICRYL 0 CT-1 36 IN J946H

## (undated) DEVICE — STERILE LATEX POWDER-FREE SURGICAL GLOVESWITH NITRILE COATING: Brand: PROTEXIS

## (undated) DEVICE — GLOVE INDICATOR PI UNDERGLOVE SZ 7.5 BLUE

## (undated) DEVICE — UNDER BUTTOCKS DRAPE W/FLUID CONTROL POUCH: Brand: CONVERTORS

## (undated) DEVICE — SUT PDS II 2-0 CT-2 27 IN Z333H

## (undated) DEVICE — IV FLUSH NSS 10ML POSIFLUSH

## (undated) DEVICE — GLOVE PI ULTRA TOUCH SZ.8.0

## (undated) DEVICE — TUBING SUCTION 5MM X 12 FT

## (undated) DEVICE — SYRINGE 3ML LL

## (undated) DEVICE — CATH FOLEY 18FR 5ML 2 WAY UNCOATED SILICONE

## (undated) DEVICE — INTENT TO BE USED WITH SUTURE MATERIAL FOR TISSUE CLOSURE: Brand: RICHARD-ALLAN® NEEDLE 1/2 CIRCLE TAPER

## (undated) DEVICE — DECANTER: Brand: UNBRANDED

## (undated) DEVICE — PREMIUM DRY TRAY LF: Brand: MEDLINE INDUSTRIES, INC.

## (undated) DEVICE — CAUTERY TIP POLISHER: Brand: DEVON

## (undated) DEVICE — 2000CC GUARDIAN II: Brand: GUARDIAN

## (undated) DEVICE — PACKING VAGINAL 2 IN

## (undated) DEVICE — NEEDLE SPINAL 22G X 7IN QUINCKE

## (undated) DEVICE — MEDI-VAC YANKAUER SUCTION HANDLE W/BULBOUS AND CONTROL VENT: Brand: CARDINAL HEALTH

## (undated) DEVICE — SUT VICRYL 2-0 SH 27 IN UNDYED J417H

## (undated) DEVICE — SMOKE EVACUATION TUBING WITH 8 IN INTEGRAL WAND AND SPONGE GUARD: Brand: BUFFALO FILTER

## (undated) DEVICE — ALLENTOWN DR  LUCENTE S LAP PK: Brand: CARDINAL HEALTH